# Patient Record
Sex: FEMALE | Race: WHITE | Employment: FULL TIME | URBAN - METROPOLITAN AREA
[De-identification: names, ages, dates, MRNs, and addresses within clinical notes are randomized per-mention and may not be internally consistent; named-entity substitution may affect disease eponyms.]

---

## 2020-06-10 ENCOUNTER — OFFICE VISIT (OUTPATIENT)
Dept: FAMILY MEDICINE CLINIC | Age: 60
End: 2020-06-10
Payer: MEDICARE

## 2020-06-10 ENCOUNTER — HOSPITAL ENCOUNTER (OUTPATIENT)
Age: 60
Discharge: HOME | End: 2020-06-12
Payer: MEDICARE

## 2020-06-10 VITALS
SYSTOLIC BLOOD PRESSURE: 102 MMHG | DIASTOLIC BLOOD PRESSURE: 60 MMHG | TEMPERATURE: 98.2 F | HEART RATE: 71 BPM | BODY MASS INDEX: 28.89 KG/M2 | RESPIRATION RATE: 16 BRPM | OXYGEN SATURATION: 100 % | HEIGHT: 62 IN | WEIGHT: 157 LBS

## 2020-06-10 LAB
ALBUMIN SERPL-MCNC: 4.2 G/DL (ref 3.5–5.2)
ALP BLD-CCNC: 72 U/L (ref 35–104)
ALT SERPL-CCNC: 17 U/L (ref 0–32)
ANION GAP SERPL CALCULATED.3IONS-SCNC: 17 MMOL/L (ref 7–16)
AST SERPL-CCNC: 24 U/L (ref 0–31)
BILIRUB SERPL-MCNC: 0.4 MG/DL (ref 0–1.2)
BUN BLDV-MCNC: 15 MG/DL (ref 8–23)
CALCIUM SERPL-MCNC: 9.3 MG/DL (ref 8.6–10.2)
CHLORIDE BLD-SCNC: 105 MMOL/L (ref 98–107)
CHOLESTEROL, TOTAL: 189 MG/DL (ref 0–199)
CO2: 20 MMOL/L (ref 22–29)
CREAT SERPL-MCNC: 0.8 MG/DL (ref 0.5–1)
GFR AFRICAN AMERICAN: >60
GFR NON-AFRICAN AMERICAN: >60 ML/MIN/1.73
GLUCOSE BLD-MCNC: 90 MG/DL (ref 74–99)
HBA1C MFR BLD: 5.9 % (ref 4–5.6)
HCT VFR BLD CALC: 40.5 % (ref 34–48)
HDLC SERPL-MCNC: 59 MG/DL
HEMOGLOBIN: 12.8 G/DL (ref 11.5–15.5)
LDL CHOLESTEROL CALCULATED: 116 MG/DL (ref 0–99)
MCH RBC QN AUTO: 29.6 PG (ref 26–35)
MCHC RBC AUTO-ENTMCNC: 31.6 % (ref 32–34.5)
MCV RBC AUTO: 93.5 FL (ref 80–99.9)
PDW BLD-RTO: 13.4 FL (ref 11.5–15)
PLATELET # BLD: 290 E9/L (ref 130–450)
PMV BLD AUTO: 10 FL (ref 7–12)
POTASSIUM SERPL-SCNC: 4.4 MMOL/L (ref 3.5–5)
RBC # BLD: 4.33 E12/L (ref 3.5–5.5)
SODIUM BLD-SCNC: 142 MMOL/L (ref 132–146)
TOTAL PROTEIN: 7.8 G/DL (ref 6.4–8.3)
TRIGL SERPL-MCNC: 71 MG/DL (ref 0–149)
TSH SERPL DL<=0.05 MIU/L-ACNC: 1.37 UIU/ML (ref 0.27–4.2)
VLDLC SERPL CALC-MCNC: 14 MG/DL
WBC # BLD: 4.4 E9/L (ref 4.5–11.5)

## 2020-06-10 PROCEDURE — 84443 ASSAY THYROID STIM HORMONE: CPT

## 2020-06-10 PROCEDURE — 36415 COLL VENOUS BLD VENIPUNCTURE: CPT | Performed by: FAMILY MEDICINE

## 2020-06-10 PROCEDURE — 80061 LIPID PANEL: CPT

## 2020-06-10 PROCEDURE — 83036 HEMOGLOBIN GLYCOSYLATED A1C: CPT

## 2020-06-10 PROCEDURE — 85027 COMPLETE CBC AUTOMATED: CPT

## 2020-06-10 PROCEDURE — 96372 THER/PROPH/DIAG INJ SC/IM: CPT | Performed by: FAMILY MEDICINE

## 2020-06-10 PROCEDURE — 99214 OFFICE O/P EST MOD 30 MIN: CPT | Performed by: FAMILY MEDICINE

## 2020-06-10 PROCEDURE — 80053 COMPREHEN METABOLIC PANEL: CPT

## 2020-06-10 RX ORDER — DEXAMETHASONE SODIUM PHOSPHATE 4 MG/ML
4 INJECTION, SOLUTION INTRA-ARTICULAR; INTRALESIONAL; INTRAMUSCULAR; INTRAVENOUS; SOFT TISSUE ONCE
Status: COMPLETED | OUTPATIENT
Start: 2020-06-10 | End: 2020-06-10

## 2020-06-10 RX ADMIN — DEXAMETHASONE SODIUM PHOSPHATE 4 MG: 4 INJECTION, SOLUTION INTRA-ARTICULAR; INTRALESIONAL; INTRAMUSCULAR; INTRAVENOUS; SOFT TISSUE at 08:32

## 2020-06-10 ASSESSMENT — PATIENT HEALTH QUESTIONNAIRE - PHQ9
SUM OF ALL RESPONSES TO PHQ QUESTIONS 1-9: 0
2. FEELING DOWN, DEPRESSED OR HOPELESS: 0
SUM OF ALL RESPONSES TO PHQ QUESTIONS 1-9: 0
1. LITTLE INTEREST OR PLEASURE IN DOING THINGS: 0
SUM OF ALL RESPONSES TO PHQ9 QUESTIONS 1 & 2: 0

## 2020-06-12 PROBLEM — L30.9 DERMATITIS: Status: ACTIVE | Noted: 2020-06-12

## 2020-06-12 PROBLEM — E78.2 MIXED HYPERLIPIDEMIA: Status: ACTIVE | Noted: 2020-06-12

## 2020-06-12 PROBLEM — R53.83 FATIGUE: Status: ACTIVE | Noted: 2020-06-12

## 2020-06-12 ASSESSMENT — ENCOUNTER SYMPTOMS
SHORTNESS OF BREATH: 0
RHINORRHEA: 0
ALLERGIC/IMMUNOLOGIC NEGATIVE: 1
VOMITING: 0
ANAL BLEEDING: 0
SORE THROAT: 0
TROUBLE SWALLOWING: 0
COLOR CHANGE: 0
EYE PAIN: 0
CHOKING: 0
BLOOD IN STOOL: 0
WHEEZING: 0
VOICE CHANGE: 0
RECTAL PAIN: 0
NAUSEA: 0
RESPIRATORY NEGATIVE: 1
EYE DISCHARGE: 0
COUGH: 0
EYE REDNESS: 0
BACK PAIN: 0
ABDOMINAL PAIN: 0
EYES NEGATIVE: 1
EYE ITCHING: 0
FACIAL SWELLING: 0
ABDOMINAL DISTENTION: 0
DIARRHEA: 0
STRIDOR: 0
SINUS PRESSURE: 0
PHOTOPHOBIA: 0
SINUS PAIN: 0
CHEST TIGHTNESS: 0
CONSTIPATION: 0

## 2020-06-12 NOTE — PROGRESS NOTES
KYE Joshi is a 61 y.o. female. HPI/Chief C/O:  Chief Complaint   Patient presents with   [facility]     located on neck, some on face     No Known Allergies  This 61year old female presents for physical exam. Pt c/o poison ivy on face, and neck. Pt denies shortness of breath, denies difficulty swallowing. ROS:  Review of Systems   Constitutional: Positive for fatigue. Negative for activity change, appetite change, chills, diaphoresis, fever and unexpected weight change. HENT: Negative. Negative for congestion, dental problem, drooling, ear discharge, ear pain, facial swelling, hearing loss, mouth sores, nosebleeds, postnasal drip, rhinorrhea, sinus pressure, sinus pain, sneezing, sore throat, tinnitus, trouble swallowing and voice change. Eyes: Negative. Negative for photophobia, pain, discharge, redness, itching and visual disturbance. Respiratory: Negative. Negative for cough, choking, chest tightness, shortness of breath, wheezing and stridor. Cardiovascular: Negative. Negative for chest pain, palpitations and leg swelling. Gastrointestinal: Negative for abdominal distention, abdominal pain, anal bleeding, blood in stool, constipation, diarrhea, nausea, rectal pain and vomiting. Endocrine: Negative. Negative for cold intolerance, heat intolerance, polydipsia, polyphagia and polyuria. Genitourinary: Negative. Negative for decreased urine volume, difficulty urinating, dysuria, flank pain, frequency, genital sores, hematuria, menstrual problem, pelvic pain and urgency. Musculoskeletal: Negative. Negative for arthralgias, back pain, gait problem, joint swelling, myalgias, neck pain and neck stiffness. Skin: Positive for rash. Negative for color change, pallor and wound. Allergic/Immunologic: Negative. Neurological: Negative.   Negative for dizziness, tremors, seizures, syncope, facial asymmetry, speech difficulty, weakness, light-headedness, numbness and

## 2020-08-31 ENCOUNTER — TELEPHONE (OUTPATIENT)
Dept: PRIMARY CARE CLINIC | Age: 60
End: 2020-08-31

## 2020-08-31 NOTE — TELEPHONE ENCOUNTER
Called pt to follow up with pt about over due order for [place]. No answer, LVM to return call to the office. Will try to call the pt tomorrow.   Geo Bradford

## 2020-10-12 ENCOUNTER — OFFICE VISIT (OUTPATIENT)
Dept: FAMILY MEDICINE CLINIC | Age: 60
End: 2020-10-12
Payer: COMMERCIAL

## 2020-10-12 VITALS
HEIGHT: 62 IN | TEMPERATURE: 98.6 F | OXYGEN SATURATION: 98 % | WEIGHT: 159 LBS | RESPIRATION RATE: 18 BRPM | BODY MASS INDEX: 29.26 KG/M2 | HEART RATE: 64 BPM

## 2020-10-12 PROCEDURE — 99213 OFFICE O/P EST LOW 20 MIN: CPT | Performed by: FAMILY MEDICINE

## 2020-10-12 PROCEDURE — 96372 THER/PROPH/DIAG INJ SC/IM: CPT | Performed by: FAMILY MEDICINE

## 2020-10-12 RX ORDER — DEXAMETHASONE SODIUM PHOSPHATE 4 MG/ML
4 INJECTION, SOLUTION INTRA-ARTICULAR; INTRALESIONAL; INTRAMUSCULAR; INTRAVENOUS; SOFT TISSUE ONCE
Status: COMPLETED | OUTPATIENT
Start: 2020-10-12 | End: 2020-10-12

## 2020-10-12 RX ORDER — METHYLPREDNISOLONE 4 MG/1
TABLET ORAL
Qty: 1 KIT | Refills: 0 | Status: SHIPPED | OUTPATIENT
Start: 2020-10-12 | End: 2020-10-18

## 2020-10-12 RX ORDER — FEXOFENADINE HCL 180 MG/1
180 TABLET ORAL DAILY
Qty: 30 TABLET | Refills: 0 | Status: SHIPPED | OUTPATIENT
Start: 2020-10-12 | End: 2020-11-11

## 2020-10-12 RX ADMIN — DEXAMETHASONE SODIUM PHOSPHATE 4 MG: 4 INJECTION, SOLUTION INTRA-ARTICULAR; INTRALESIONAL; INTRAMUSCULAR; INTRAVENOUS; SOFT TISSUE at 11:53

## 2020-10-12 ASSESSMENT — ENCOUNTER SYMPTOMS
EYE PAIN: 0
CONSTIPATION: 0
VOMITING: 0
SINUS PAIN: 0
SHORTNESS OF BREATH: 0
RESPIRATORY NEGATIVE: 1
ANAL BLEEDING: 0
RECTAL PAIN: 0
VOICE CHANGE: 0
ALLERGIC/IMMUNOLOGIC NEGATIVE: 1
ABDOMINAL PAIN: 0
WHEEZING: 0
BLOOD IN STOOL: 0
CHOKING: 0
ABDOMINAL DISTENTION: 0
SINUS PRESSURE: 0
CHEST TIGHTNESS: 0
DIARRHEA: 0
STRIDOR: 0
PHOTOPHOBIA: 0
EYE ITCHING: 0
SORE THROAT: 0
RHINORRHEA: 0
EYE DISCHARGE: 0
COLOR CHANGE: 0
EYES NEGATIVE: 1
EYE REDNESS: 0
NAUSEA: 0
FACIAL SWELLING: 0
TROUBLE SWALLOWING: 0
BACK PAIN: 0
COUGH: 0

## 2020-10-12 NOTE — PROGRESS NOTES
KYE Shah is a 61 y.o. female. HPI/Chief C/O:  Chief Complaint   Patient presents with    Poison Ivy     Pt c/o poison ivy rash on her face, neck, arms, and hands - thinks she got exposed on Thursday     No Known Allergies  The patient is here for a medication list and treatment planning review  We will go over our care planning goals as well as take care of all refills  We will set up labs as well   C/O poison ivy     Poison Ivy   Associated symptoms include fatigue. Pertinent negatives include no congestion, cough, diarrhea, eye pain, fever, rhinorrhea, shortness of breath, sore throat or vomiting. ROS:  Review of Systems   Constitutional: Positive for fatigue. Negative for activity change, appetite change, chills, diaphoresis, fever and unexpected weight change. HENT: Negative. Negative for congestion, dental problem, drooling, ear discharge, ear pain, facial swelling, hearing loss, mouth sores, nosebleeds, postnasal drip, rhinorrhea, sinus pressure, sinus pain, sneezing, sore throat, tinnitus, trouble swallowing and voice change. Eyes: Negative. Negative for photophobia, pain, discharge, redness, itching and visual disturbance. Respiratory: Negative. Negative for cough, choking, chest tightness, shortness of breath, wheezing and stridor. Cardiovascular: Negative. Negative for chest pain, palpitations and leg swelling. Gastrointestinal: Negative for abdominal distention, abdominal pain, anal bleeding, blood in stool, constipation, diarrhea, nausea, rectal pain and vomiting. Endocrine: Negative. Negative for cold intolerance, heat intolerance, polydipsia, polyphagia and polyuria. Genitourinary: Negative. Negative for decreased urine volume, difficulty urinating, dyspareunia, dysuria, enuresis, flank pain, frequency, genital sores, hematuria, menstrual problem, pelvic pain and urgency. Musculoskeletal: Negative.   Negative for arthralgias, back pain, gait problem, joint swelling, myalgias, neck pain and neck stiffness. Skin: Positive for rash. Negative for color change, pallor and wound. Allergic/Immunologic: Negative. Neurological: Negative. Negative for dizziness, tremors, seizures, syncope, facial asymmetry, speech difficulty, weakness, light-headedness, numbness and headaches. Hematological: Negative. Negative for adenopathy. Does not bruise/bleed easily. Psychiatric/Behavioral: Negative for agitation, behavioral problems, confusion, decreased concentration, dysphoric mood, hallucinations, self-injury, sleep disturbance and suicidal ideas. The patient is nervous/anxious. The patient is not hyperactive. Past Medical/Surgical Hx;  Reviewed with patient      Diagnosis Date    Mixed hyperlipidemia 6/12/2020     Past Surgical History:   Procedure Laterality Date    BREAST LUMPECTOMY      TONSILLECTOMY         Past Family Hx:  Reviewed with patient  History reviewed. No pertinent family history. Social Hx:  Reviewed with patient  Social History     Tobacco Use    Smoking status: Never Smoker    Smokeless tobacco: Never Used   Substance Use Topics    Alcohol use: Yes     Comment: occasionally       OBJECTIVE  Pulse 64   Temp 98.6 °F (37 °C) (Temporal)   Resp 18   Ht 5' 2\" (1.575 m)   Wt 159 lb (72.1 kg)   LMP 06/10/2016 (Exact Date)   SpO2 98%   BMI 29.08 kg/m²     Problem List:  Yenny Lyons does not have any pertinent problems on file. PHYS EX:  Physical Exam  Vitals signs and nursing note reviewed. Constitutional:       General: She is not in acute distress. Appearance: Normal appearance. She is well-developed. She is obese. She is not ill-appearing, toxic-appearing or diaphoretic. HENT:      Head: Normocephalic and atraumatic. Right Ear: Tympanic membrane, ear canal and external ear normal.      Left Ear: Tympanic membrane, ear canal and external ear normal.      Nose: Nose normal. No congestion or rhinorrhea. Mouth/Throat:      Mouth: Mucous membranes are moist.      Pharynx: Oropharynx is clear. No oropharyngeal exudate or posterior oropharyngeal erythema. Eyes:      General: No scleral icterus. Right eye: No discharge. Left eye: No discharge. Conjunctiva/sclera: Conjunctivae normal.      Pupils: Pupils are equal, round, and reactive to light. Neck:      Musculoskeletal: Normal range of motion and neck supple. No neck rigidity or muscular tenderness. Thyroid: No thyromegaly. Vascular: No carotid bruit or JVD. Trachea: No tracheal deviation. Cardiovascular:      Rate and Rhythm: Normal rate and regular rhythm. Pulses: Normal pulses. Heart sounds: Normal heart sounds. No murmur. No friction rub. No gallop. Pulmonary:      Effort: Pulmonary effort is normal. No respiratory distress. Breath sounds: Normal breath sounds. No stridor. No wheezing, rhonchi or rales. Chest:      Chest wall: No tenderness. Abdominal:      General: Bowel sounds are normal. There is no distension. Palpations: Abdomen is soft. There is no mass. Tenderness: There is no abdominal tenderness. There is no guarding or rebound. Hernia: No hernia is present. Musculoskeletal: Normal range of motion. General: No swelling, tenderness, deformity or signs of injury. Right lower leg: No edema. Left lower leg: No edema. Lymphadenopathy:      Cervical: No cervical adenopathy. Skin:     General: Skin is warm. Coloration: Skin is not jaundiced or pale. Findings: Rash present. No bruising, erythema or lesion. Comments: Pt has small areas of poison ivy on face and neck. Neurological:      General: No focal deficit present. Mental Status: She is alert and oriented to person, place, and time. Cranial Nerves: No cranial nerve deficit. Sensory: No sensory deficit. Motor: No weakness or abnormal muscle tone.       Coordination: Coordination normal.      Gait: Gait normal.      Deep Tendon Reflexes: Reflexes are normal and symmetric. Reflexes normal.         ASSESSMENT/PLAN  Randall Weston was seen today for poison ivy. Diagnoses and all orders for this visit:    Poison ivy  -     dexamethasone (DECADRON) injection 4 mg  -     methylPREDNISolone (MEDROL DOSEPACK) 4 MG tablet; Take as directed  -     hydrocortisone 2.5 % cream; Apply topically 2 times daily. -     fexofenadine (ALLEGRA) 180 MG tablet; Take 1 tablet by mouth daily    Screen for colon cancer  -     [place] (For External Results Only); Future    Fatigue, unspecified type  Long talk on treatment and prevention  Literature is given         Outpatient Encounter Medications as of 10/12/2020   Medication Sig Dispense Refill    methylPREDNISolone (MEDROL DOSEPACK) 4 MG tablet Take as directed 1 kit 0    hydrocortisone 2.5 % cream Apply topically 2 times daily. 1 Tube 3    fexofenadine (ALLEGRA) 180 MG tablet Take 1 tablet by mouth daily 30 tablet 0    [] dexamethasone (DECADRON) injection 4 mg        No facility-administered encounter medications on file as of 10/12/2020. No follow-ups on file.         Reviewed recent labs related to Aparna's current problems      Discussed importance of regular Health Maintenance follow up  Health Maintenance   Topic    Hepatitis C screen     DTaP/Tdap/Td vaccine (1 - Tdap)    Cervical cancer screen     Shingles Vaccine (1 of 2)    Colon cancer screen colonoscopy     Annual Wellness Visit (AWV)     Flu vaccine (1)    A1C test (Diabetic or Prediabetic)     Breast cancer screen     Lipid screen     HIV screen     Hepatitis A vaccine     Hepatitis B vaccine     Hib vaccine     Meningococcal (ACWY) vaccine     Pneumococcal 0-64 years Vaccine

## 2021-01-11 ENCOUNTER — HOSPITAL ENCOUNTER (OUTPATIENT)
Dept: GENERAL RADIOLOGY | Age: 61
Discharge: HOME | End: 2021-01-13
Payer: COMMERCIAL

## 2021-01-11 ENCOUNTER — HOSPITAL ENCOUNTER (OUTPATIENT)
Age: 61
Discharge: HOME | End: 2021-01-13
Payer: COMMERCIAL

## 2021-01-11 ENCOUNTER — VIRTUAL VISIT (OUTPATIENT)
Dept: FAMILY MEDICINE CLINIC | Age: 61
End: 2021-01-11
Payer: COMMERCIAL

## 2021-01-11 DIAGNOSIS — R50.9 FEVER, UNSPECIFIED FEVER CAUSE: ICD-10-CM

## 2021-01-11 DIAGNOSIS — R11.0 NAUSEA: ICD-10-CM

## 2021-01-11 DIAGNOSIS — R07.9 CHEST PAIN, UNSPECIFIED TYPE: Primary | ICD-10-CM

## 2021-01-11 DIAGNOSIS — R19.7 DIARRHEA, UNSPECIFIED TYPE: ICD-10-CM

## 2021-01-11 DIAGNOSIS — R07.9 CHEST PAIN, UNSPECIFIED TYPE: ICD-10-CM

## 2021-01-11 DIAGNOSIS — R43.2 LOSS OF TASTE: ICD-10-CM

## 2021-01-11 PROCEDURE — 99213 OFFICE O/P EST LOW 20 MIN: CPT | Performed by: FAMILY MEDICINE

## 2021-01-11 PROCEDURE — 71046 X-RAY EXAM CHEST 2 VIEWS: CPT

## 2021-01-11 RX ORDER — AZITHROMYCIN 250 MG/1
250 TABLET, FILM COATED ORAL SEE ADMIN INSTRUCTIONS
Qty: 6 TABLET | Refills: 0 | Status: SHIPPED | OUTPATIENT
Start: 2021-01-11 | End: 2021-01-16

## 2021-01-11 RX ORDER — METHYLPREDNISOLONE 4 MG/1
TABLET ORAL
Qty: 1 KIT | Refills: 0 | Status: SHIPPED | OUTPATIENT
Start: 2021-01-11 | End: 2021-01-17

## 2021-01-11 ASSESSMENT — PATIENT HEALTH QUESTIONNAIRE - PHQ9
1. LITTLE INTEREST OR PLEASURE IN DOING THINGS: 0
SUM OF ALL RESPONSES TO PHQ QUESTIONS 1-9: 0
SUM OF ALL RESPONSES TO PHQ QUESTIONS 1-9: 0

## 2021-01-12 ENCOUNTER — TELEPHONE (OUTPATIENT)
Dept: FAMILY MEDICINE CLINIC | Age: 61
End: 2021-01-12

## 2021-01-13 ENCOUNTER — TELEPHONE (OUTPATIENT)
Dept: FAMILY MEDICINE CLINIC | Age: 61
End: 2021-01-13

## 2021-01-13 NOTE — TELEPHONE ENCOUNTER
Patient notified. She will call and schedule for next week. Advised to let us know if symptoms change or worsen.   Electronically signed by Marlyn Grimes on 1/13/2021 at 9:19 AM

## 2021-01-13 NOTE — TELEPHONE ENCOUNTER
Call pt and let her know her CXR show inflammation lower lung     Continue medication and recheck virtual visit next week   Plan to recheck CXR in 1 week

## 2021-01-13 NOTE — TELEPHONE ENCOUNTER
Patient notified; will call next week to schedule.   Electronically signed by Estevan Raymond on 1/13/2021 at 10:16 AM

## 2021-01-13 NOTE — TELEPHONE ENCOUNTER
There is lower lobe inflammation  Continue medications and recheck next week  We will repeat chest X ray

## 2021-01-16 PROBLEM — R19.7 DIARRHEA: Status: ACTIVE | Noted: 2021-01-16

## 2021-01-16 PROBLEM — R43.2 LOSS OF TASTE: Status: ACTIVE | Noted: 2021-01-16

## 2021-01-16 PROBLEM — R07.9 CHEST PAIN: Status: ACTIVE | Noted: 2021-01-16

## 2021-01-16 PROBLEM — R11.0 NAUSEA: Status: ACTIVE | Noted: 2021-01-16

## 2021-01-16 PROBLEM — R50.9 FEVER: Status: ACTIVE | Noted: 2021-01-16

## 2021-01-16 ASSESSMENT — ENCOUNTER SYMPTOMS
CHEST TIGHTNESS: 1
COUGH: 0
ALLERGIC/IMMUNOLOGIC NEGATIVE: 1
SINUS PAIN: 0
TROUBLE SWALLOWING: 0
EYE REDNESS: 0
RHINORRHEA: 0
FACIAL SWELLING: 0
EYE ITCHING: 0
BACK PAIN: 0
SHORTNESS OF BREATH: 0
ABDOMINAL DISTENTION: 0
COLOR CHANGE: 0
CONSTIPATION: 0
CHOKING: 0
RECTAL PAIN: 0
PHOTOPHOBIA: 0
ANAL BLEEDING: 0
STRIDOR: 0
EYES NEGATIVE: 1
EYE DISCHARGE: 0
ABDOMINAL PAIN: 0
SORE THROAT: 0
EYE PAIN: 0
VOICE CHANGE: 0
DIARRHEA: 1
BLOOD IN STOOL: 0
WHEEZING: 0
SINUS PRESSURE: 0
NAUSEA: 1
VOMITING: 1

## 2021-01-17 NOTE — PROGRESS NOTES
SUBJECTIVE  Roberta Roper is a 64 y.o. female. HPI/Chief C/O:  Chief Complaint   Patient presents with    Fever     c/o fever; rundown-patient states she lost her mother one week ago and thinks it may be from this. She was tested for Covid at [facility] was negative. C/o taste and smell came back today. Fever  99; c/o pressure in her chest when she inhales. c/o diarrhea    Other     patient consents to telemedicine visit and is at home in [place]     No Known Allergies  This 64year old female presents with fever at night, nausea, vomiting, diarrhea, and chest pain with deep inspiration. Pt had negative covid on 1/3/2021. Pt lost sense of taste and smell and it is coming back. TeleMedicine Video Visit    This visit was performed as a virtual video visit using a synchronous, two-way, audio-video telehealth technology platform. Patient identification was verified at the start of the visit, including the patient's telephone number and physical location. I discussed with the patient the nature of our telehealth visits, that:     1. Due to the nature of an audio- video modality, the only components of a physical exam that could be done are the elements supported by direct observation. 2. I would evaluate the patient and recommend diagnostics and treatments based on my assessment. 3. If it was felt that the patient should be evaluated in clinic or an emergency room setting, then they would be directed there. 4. Our sessions are not being recorded and that personal health information is protected. 5. Our team would provide follow up care in person if/when the patient needs it. Patient does agree to proceed with telemedicine consultation. Patient's location: home address in [facility]  Physician  location other address in [facility] other people involved in call Dr. Lala Porras      Time spent: Greater than 20    This visit was completed virtually using Doxy. me      ROS:  Review of Systems Constitutional: Positive for fatigue and fever. Negative for activity change, appetite change, chills, diaphoresis and unexpected weight change. HENT: Negative. Negative for congestion, dental problem, drooling, ear discharge, ear pain, facial swelling, hearing loss, mouth sores, nosebleeds, postnasal drip, rhinorrhea, sinus pressure, sinus pain, sneezing, sore throat, tinnitus, trouble swallowing and voice change. Eyes: Negative. Negative for photophobia, pain, discharge, redness, itching and visual disturbance. Respiratory: Positive for chest tightness. Negative for cough, choking, shortness of breath, wheezing and stridor. Cardiovascular: Negative. Negative for chest pain, palpitations and leg swelling. Gastrointestinal: Positive for diarrhea, nausea and vomiting. Negative for abdominal distention, abdominal pain, anal bleeding, blood in stool, constipation and rectal pain. Endocrine: Negative. Negative for cold intolerance, heat intolerance, polydipsia, polyphagia and polyuria. Genitourinary: Negative. Negative for decreased urine volume, difficulty urinating, dysuria, flank pain, frequency, genital sores, hematuria, menstrual problem, pelvic pain and urgency. Musculoskeletal: Negative. Negative for arthralgias, back pain, gait problem, joint swelling, myalgias, neck pain and neck stiffness. Skin: Negative. Negative for color change, pallor, rash and wound. Allergic/Immunologic: Negative. Neurological: Negative. Negative for dizziness, tremors, seizures, syncope, facial asymmetry, speech difficulty, weakness, light-headedness, numbness and headaches. Hematological: Negative. Negative for adenopathy. Does not bruise/bleed easily. Psychiatric/Behavioral: Negative. Negative for agitation, behavioral problems, confusion, decreased concentration, dysphoric mood, hallucinations, self-injury, sleep disturbance and suicidal ideas.  The patient is not nervous/anxious and is not hyperactive. Past Medical/Surgical Hx;  Reviewed with patient      Diagnosis Date    Mixed hyperlipidemia 6/12/2020     Past Surgical History:   Procedure Laterality Date    BREAST LUMPECTOMY      TONSILLECTOMY         Past Family Hx:  Reviewed with patient  History reviewed. No pertinent family history. Social Hx:  Reviewed with patient  Social History     Tobacco Use    Smoking status: Never Smoker    Smokeless tobacco: Never Used   Substance Use Topics    Alcohol use: Yes     Comment: occasionally       OBJECTIVE  LMP 06/10/2016 (Exact Date)     Problem List:  Lyndsey Mac does not have any pertinent problems on file. PHYS EX:  Pt looks stable    ASSESSMENT/PLAN  Lyndsey Mac was seen today for fever and other. Diagnoses and all orders for this visit:    Chest pain, unspecified type  -     XR CHEST (2 VW); Future  -     methylPREDNISolone (MEDROL DOSEPACK) 4 MG tablet; Take as directed  Stable. Zpack, medrol, robitussin, decadron. Fever, unspecified fever cause  -     XR CHEST (2 VW); Future  -     azithromycin (ZITHROMAX) 250 MG tablet; Take 1 tablet by mouth See Admin Instructions for 5 days 500mg on day 1 followed by 250mg on days 2 - 5  -     methylPREDNISolone (MEDROL DOSEPACK) 4 MG tablet; Take as directed  Stable. Loss of taste  Stable. Repeat covid test.   Nausea  Stable. Diarrhea, unspecified type  Stable. Please take tylenol every 6 hours as needed for temperature or aches   Hydrate with 40 to 50 oz fluids   I have sent in medication for you  Please stay in touch and let me know how you are doing  If you get worse, please call asap  You should self isolate   Vit. C 1000 mg, vit D 1000 mg and zinc 220 mg daily    Pt instructed if any worse go ED ASAP.     Outpatient Encounter Medications as of 1/11/2021   Medication Sig Dispense Refill    azithromycin (ZITHROMAX) 250 MG tablet Take 1 tablet by mouth See Admin Instructions for 5 days 500mg on day 1 followed by 250mg on days 2 - 5 6 tablet 0    methylPREDNISolone (MEDROL DOSEPACK) 4 MG tablet Take as directed 1 kit 0    hydrocortisone 2.5 % cream Apply topically 2 times daily. (Patient not taking: Reported on 1/11/2021) 1 Tube 3     No facility-administered encounter medications on file as of 1/11/2021. No follow-ups on file.         Reviewed recent labs related to Aparna's current problems      Discussed importance of regular Health Maintenance follow up  Health Maintenance   Topic    Hepatitis C screen     DTaP/Tdap/Td vaccine (1 - Tdap)    Cervical cancer screen     Shingles Vaccine (1 of 2)    Flu vaccine (1)    A1C test (Diabetic or Prediabetic)     Breast cancer screen     Colon cancer screen fecal DNA test (Cologuard)     Lipid screen     HIV screen     Hepatitis A vaccine     Hepatitis B vaccine     Hib vaccine     Meningococcal (ACWY) vaccine     Pneumococcal 0-64 years Vaccine

## 2021-01-22 ENCOUNTER — HOSPITAL ENCOUNTER (OUTPATIENT)
Age: 61
Discharge: HOME | End: 2021-01-24
Payer: COMMERCIAL

## 2021-01-22 ENCOUNTER — HOSPITAL ENCOUNTER (OUTPATIENT)
Dept: GENERAL RADIOLOGY | Age: 61
Discharge: HOME | End: 2021-01-24
Payer: COMMERCIAL

## 2021-01-22 ENCOUNTER — VIRTUAL VISIT (OUTPATIENT)
Dept: FAMILY MEDICINE CLINIC | Age: 61
End: 2021-01-22
Payer: COMMERCIAL

## 2021-01-22 DIAGNOSIS — J18.9 PNEUMONIA OF BOTH LOWER LOBES DUE TO INFECTIOUS ORGANISM: ICD-10-CM

## 2021-01-22 DIAGNOSIS — R53.83 FATIGUE, UNSPECIFIED TYPE: ICD-10-CM

## 2021-01-22 DIAGNOSIS — J90 PLEURAL EFFUSION: ICD-10-CM

## 2021-01-22 DIAGNOSIS — J90 PLEURAL EFFUSION: Primary | ICD-10-CM

## 2021-01-22 PROCEDURE — 99213 OFFICE O/P EST LOW 20 MIN: CPT | Performed by: FAMILY MEDICINE

## 2021-01-22 PROCEDURE — 71046 X-RAY EXAM CHEST 2 VIEWS: CPT

## 2021-01-22 RX ORDER — ALBUTEROL SULFATE 90 UG/1
2 AEROSOL, METERED RESPIRATORY (INHALATION) EVERY 6 HOURS PRN
Qty: 1 INHALER | Refills: 3 | Status: SHIPPED
Start: 2021-01-22 | End: 2021-09-08

## 2021-01-22 RX ORDER — ASPIRIN 81 MG/1
81 TABLET ORAL DAILY
COMMUNITY
End: 2022-02-16

## 2021-01-27 PROBLEM — J90 PLEURAL EFFUSION: Status: ACTIVE | Noted: 2021-01-27

## 2021-01-27 PROBLEM — J18.9 PNEUMONIA OF BOTH LOWER LOBES DUE TO INFECTIOUS ORGANISM: Status: ACTIVE | Noted: 2021-01-27

## 2021-01-27 ASSESSMENT — ENCOUNTER SYMPTOMS
RECTAL PAIN: 0
SINUS PRESSURE: 0
ANAL BLEEDING: 0
ABDOMINAL DISTENTION: 0
CONSTIPATION: 0
FACIAL SWELLING: 0
EYE DISCHARGE: 0
NAUSEA: 0
DIARRHEA: 0
BLOOD IN STOOL: 0
COUGH: 0
RHINORRHEA: 0
TROUBLE SWALLOWING: 0
ALLERGIC/IMMUNOLOGIC NEGATIVE: 1
ABDOMINAL PAIN: 0
EYE ITCHING: 0
SHORTNESS OF BREATH: 1
CHEST TIGHTNESS: 0
EYE PAIN: 0
SORE THROAT: 0
VOICE CHANGE: 0
VOMITING: 0
CHOKING: 0
BACK PAIN: 0
STRIDOR: 0
SINUS PAIN: 0
WHEEZING: 0
EYES NEGATIVE: 1
PHOTOPHOBIA: 0
EYE REDNESS: 0
COLOR CHANGE: 0

## 2021-01-28 NOTE — PROGRESS NOTES
KYE Pride is a 64 y.o. female. HPI/Chief C/O:  Chief Complaint   Patient presents with    Pneumonia     Recheck ; patient states she feels much better, she sometimes gets SOB but not as much.  Medication Refill     Combivent inhaler was not covered by insurance; patient is requesting an alternative inhaler     No Known Allergies  This 64year old female presents with pleural effusion, and pneumonia richi. Lower lobes with a negative covid test. Pt states she feels much better. Pt has shortness of breath only when talking a lot. Pt denies chest pain, denies fever, and denies cough. TeleMedicine Video Visit    This visit was performed as a virtual video visit using a synchronous, two-way, audio-video telehealth technology platform. Patient identification was verified at the start of the visit, including the patient's telephone number and physical location. I discussed with the patient the nature of our telehealth visits, that:     1. Due to the nature of an audio- video modality, the only components of a physical exam that could be done are the elements supported by direct observation. 2. I would evaluate the patient and recommend diagnostics and treatments based on my assessment. 3. If it was felt that the patient should be evaluated in clinic or an emergency room setting, then they would be directed there. 4. Our sessions are not being recorded and that personal health information is protected. 5. Our team would provide follow up care in person if/when the patient needs it. Patient does agree to proceed with telemedicine consultation. Patient's location: home address in [facility]  Physician  location other address in [facility] other people involved in call Dr. Alexia Lezama      Time spent: Greater than 20    This visit was completed virtually using Doxy. me      ROS:  Review of Systems   Constitutional: Positive for fatigue.  Negative for activity change, appetite change, chills, 6/12/2020     Past Surgical History:   Procedure Laterality Date    BREAST LUMPECTOMY      TONSILLECTOMY         Past Family Hx:  Reviewed with patient  History reviewed. No pertinent family history. Social Hx:  Reviewed with patient  Social History     Tobacco Use    Smoking status: Never Smoker    Smokeless tobacco: Never Used   Substance Use Topics    Alcohol use: Yes     Comment: occasionally       OBJECTIVE  LMP 06/10/2016 (Exact Date)     Problem List:  Sanna Aguirre does not have any pertinent problems on file. PHYS EX:  Pt is talking without any difficulty    ASSESSMENT/PLAN  Sanna Aguirre was seen today for pneumonia and medication refill. Diagnoses and all orders for this visit:    Pleural effusion  -     XR CHEST (2 VW); Future  -     albuterol sulfate  (90 Base) MCG/ACT inhaler; Inhale 2 puffs into the lungs every 6 hours as needed for Wheezing or Shortness of Breath  Stable. Pneumonia of both lower lobes due to infectious organism  -     XR CHEST (2 VW); Future  -     albuterol sulfate  (90 Base) MCG/ACT inhaler; Inhale 2 puffs into the lungs every 6 hours as needed for Wheezing or Shortness of Breath  Stable. Fatigue, unspecified type  Stable. Rest.     Pt instructed to take aspirin 325 mg, vit. C 1000 mg, vit D 1000 mg, and zinc 220 mg. Pt instructed if any worse go ED ASAP. Outpatient Encounter Medications as of 1/22/2021   Medication Sig Dispense Refill    aspirin 81 MG EC tablet Take 81 mg by mouth daily      albuterol sulfate  (90 Base) MCG/ACT inhaler Inhale 2 puffs into the lungs every 6 hours as needed for Wheezing or Shortness of Breath 1 Inhaler 3    [DISCONTINUED] albuterol-ipratropium (COMBIVENT RESPIMAT)  MCG/ACT AERS inhaler Inhale 1 puff into the lungs every 6 hours 1 Inhaler 0    [DISCONTINUED] hydrocortisone 2.5 % cream Apply topically 2 times daily.  (Patient not taking: Reported on 1/11/2021) 1 Tube 3     No facility-administered encounter medications on file as of 1/22/2021. No follow-ups on file.         Reviewed recent labs related to Aparna's current problems      Discussed importance of regular Health Maintenance follow up  Health Maintenance   Topic    Hepatitis C screen     Pneumococcal 0-64 years Vaccine (1 of 1 - PPSV23)    DTaP/Tdap/Td vaccine (1 - Tdap)    Cervical cancer screen     Shingles Vaccine (1 of 2)    Flu vaccine (1)    A1C test (Diabetic or Prediabetic)     Breast cancer screen     Colon cancer screen fecal DNA test (Cologuard)     Lipid screen     HIV screen     Hepatitis A vaccine     Hepatitis B vaccine     Hib vaccine     Meningococcal (ACWY) vaccine

## 2021-02-08 DIAGNOSIS — J18.9 PNEUMONIA OF BOTH LUNGS DUE TO INFECTIOUS ORGANISM, UNSPECIFIED PART OF LUNG: Primary | ICD-10-CM

## 2021-08-31 ENCOUNTER — TELEPHONE (OUTPATIENT)
Dept: FAMILY MEDICINE CLINIC | Age: 61
End: 2021-08-31

## 2021-08-31 DIAGNOSIS — Z12.31 SCREENING MAMMOGRAM FOR HIGH-RISK PATIENT: Primary | ICD-10-CM

## 2021-09-08 ENCOUNTER — OFFICE VISIT (OUTPATIENT)
Dept: FAMILY MEDICINE CLINIC | Age: 61
End: 2021-09-08
Payer: COMMERCIAL

## 2021-09-08 VITALS
DIASTOLIC BLOOD PRESSURE: 80 MMHG | RESPIRATION RATE: 18 BRPM | BODY MASS INDEX: 30 KG/M2 | HEART RATE: 74 BPM | TEMPERATURE: 98.6 F | SYSTOLIC BLOOD PRESSURE: 120 MMHG | OXYGEN SATURATION: 98 % | HEIGHT: 62 IN | WEIGHT: 163 LBS

## 2021-09-08 DIAGNOSIS — U07.1 PNEUMONIA DUE TO COVID-19 VIRUS: ICD-10-CM

## 2021-09-08 DIAGNOSIS — Z00.00 PHYSICAL EXAM: ICD-10-CM

## 2021-09-08 DIAGNOSIS — J12.82 PNEUMONIA DUE TO COVID-19 VIRUS: ICD-10-CM

## 2021-09-08 DIAGNOSIS — K12.1 VIRAL STOMATITIS: Primary | ICD-10-CM

## 2021-09-08 DIAGNOSIS — R53.83 FATIGUE, UNSPECIFIED TYPE: ICD-10-CM

## 2021-09-08 DIAGNOSIS — Z12.31 ENCOUNTER FOR SCREENING MAMMOGRAM FOR MALIGNANT NEOPLASM OF BREAST: ICD-10-CM

## 2021-09-08 DIAGNOSIS — E78.2 MIXED HYPERLIPIDEMIA: ICD-10-CM

## 2021-09-08 DIAGNOSIS — B97.89 VIRAL STOMATITIS: Primary | ICD-10-CM

## 2021-09-08 DIAGNOSIS — R73.01 IFG (IMPAIRED FASTING GLUCOSE): ICD-10-CM

## 2021-09-08 PROCEDURE — 99214 OFFICE O/P EST MOD 30 MIN: CPT | Performed by: FAMILY MEDICINE

## 2021-09-08 RX ORDER — ACYCLOVIR 50 MG/G
OINTMENT TOPICAL
Qty: 1 EACH | Refills: 0 | Status: SHIPPED | OUTPATIENT
Start: 2021-09-08 | End: 2021-09-15

## 2021-09-08 SDOH — ECONOMIC STABILITY: FOOD INSECURITY: WITHIN THE PAST 12 MONTHS, YOU WORRIED THAT YOUR FOOD WOULD RUN OUT BEFORE YOU GOT MONEY TO BUY MORE.: NEVER TRUE

## 2021-09-08 SDOH — ECONOMIC STABILITY: FOOD INSECURITY: WITHIN THE PAST 12 MONTHS, THE FOOD YOU BOUGHT JUST DIDN'T LAST AND YOU DIDN'T HAVE MONEY TO GET MORE.: NEVER TRUE

## 2021-09-08 ASSESSMENT — SOCIAL DETERMINANTS OF HEALTH (SDOH): HOW HARD IS IT FOR YOU TO PAY FOR THE VERY BASICS LIKE FOOD, HOUSING, MEDICAL CARE, AND HEATING?: NOT HARD AT ALL

## 2021-09-10 PROBLEM — Z00.00 PHYSICAL EXAM: Status: ACTIVE | Noted: 2021-09-10

## 2021-09-10 PROBLEM — B97.89 VIRAL STOMATITIS: Status: ACTIVE | Noted: 2021-09-10

## 2021-09-10 PROBLEM — Z12.31 ENCOUNTER FOR SCREENING MAMMOGRAM FOR MALIGNANT NEOPLASM OF BREAST: Status: ACTIVE | Noted: 2021-09-10

## 2021-09-10 PROBLEM — U07.1 PNEUMONIA DUE TO COVID-19 VIRUS: Status: ACTIVE | Noted: 2021-09-10

## 2021-09-10 PROBLEM — K12.1 VIRAL STOMATITIS: Status: ACTIVE | Noted: 2021-09-10

## 2021-09-10 PROBLEM — J12.82 PNEUMONIA DUE TO COVID-19 VIRUS: Status: ACTIVE | Noted: 2021-09-10

## 2021-09-10 ASSESSMENT — ENCOUNTER SYMPTOMS
VOMITING: 0
EYE REDNESS: 0
ABDOMINAL DISTENTION: 0
NAUSEA: 0
EYE ITCHING: 0
SINUS PAIN: 0
TROUBLE SWALLOWING: 0
CHOKING: 0
COLOR CHANGE: 0
SORE THROAT: 0
FACIAL SWELLING: 0
EYES NEGATIVE: 1
SINUS PRESSURE: 0
ALLERGIC/IMMUNOLOGIC NEGATIVE: 1
DIARRHEA: 0
EYE PAIN: 0
BLOOD IN STOOL: 0
WHEEZING: 0
STRIDOR: 0
COUGH: 0
RESPIRATORY NEGATIVE: 1
CONSTIPATION: 0
BACK PAIN: 0
ANAL BLEEDING: 0
SHORTNESS OF BREATH: 0
VOICE CHANGE: 0
PHOTOPHOBIA: 0
CHEST TIGHTNESS: 0
ABDOMINAL PAIN: 0
RECTAL PAIN: 0
EYE DISCHARGE: 0
RHINORRHEA: 0

## 2021-09-10 NOTE — PROGRESS NOTES
SUBJECTIVE  Kennedi Lutz is a 64 y.o. female. HPI/Chief C/O:  Chief Complaint   Patient presents with    Annual Exam     Physical    Oral Pain     Pt c/o chapped lips x3 weeks, pt states that she has tried treating at home but they are not healing    Halima Paz     No Known Allergies    This 64year old female presents with chapped, burned lips for 3 weeks. Pt states she first got sunburned and she got chapped, not improving. Pt  Has hyperlipidemia, and fatigue. Pt has covid pneumonia, will set up recheck chest xray. ROS:  Review of Systems   Constitutional: Positive for fatigue. Negative for activity change, appetite change, chills, diaphoresis, fever and unexpected weight change. HENT: Negative. Negative for congestion, dental problem, drooling, ear discharge, ear pain, facial swelling, hearing loss, mouth sores, nosebleeds, postnasal drip, rhinorrhea, sinus pressure, sinus pain, sneezing, sore throat, tinnitus, trouble swallowing and voice change. Eyes: Negative. Negative for photophobia, pain, discharge, redness, itching and visual disturbance. Respiratory: Negative. Negative for cough, choking, chest tightness, shortness of breath, wheezing and stridor. Cardiovascular: Negative. Negative for chest pain, palpitations and leg swelling. Gastrointestinal: Negative for abdominal distention, abdominal pain, anal bleeding, blood in stool, constipation, diarrhea, nausea, rectal pain and vomiting. Endocrine: Negative. Negative for cold intolerance, heat intolerance, polydipsia, polyphagia and polyuria. Genitourinary: Negative. Negative for decreased urine volume, difficulty urinating, dyspareunia, dysuria, enuresis, flank pain, frequency, genital sores, hematuria, menstrual problem, pelvic pain and urgency. Musculoskeletal: Negative.   Negative for arthralgias, back pain, gait problem, joint swelling, myalgias, neck pain and neck stiffness. Skin: Positive for rash. Negative for color change, pallor and wound. Allergic/Immunologic: Negative. Neurological: Negative. Negative for dizziness, tremors, seizures, syncope, facial asymmetry, speech difficulty, weakness, light-headedness, numbness and headaches. Hematological: Negative. Negative for adenopathy. Does not bruise/bleed easily. Psychiatric/Behavioral: Negative for agitation, behavioral problems, confusion, decreased concentration, dysphoric mood, hallucinations, self-injury, sleep disturbance and suicidal ideas. The patient is nervous/anxious. The patient is not hyperactive. Past Medical/Surgical Hx;  Reviewed with patient      Diagnosis Date    Mixed hyperlipidemia 6/12/2020     Past Surgical History:   Procedure Laterality Date    BREAST LUMPECTOMY      TONSILLECTOMY         Past Family Hx:  Reviewed with patient  History reviewed. No pertinent family history. Social Hx:  Reviewed with patient  Social History     Tobacco Use    Smoking status: Never Smoker    Smokeless tobacco: Never Used   Substance Use Topics    Alcohol use: Yes     Comment: occasionally       OBJECTIVE  /80   Pulse 74   Temp 98.6 °F (37 °C) (Temporal)   Resp 18   Ht 5' 2\" (1.575 m)   Wt 163 lb (73.9 kg)   LMP 06/10/2016 (Exact Date)   SpO2 98%   Breastfeeding No   BMI 29.81 kg/m²     Problem List:  Susie Collier does not have any pertinent problems on file. PHYS EX:  Physical Exam  Vitals and nursing note reviewed. Constitutional:       General: She is not in acute distress. Appearance: Normal appearance. She is well-developed. She is obese. She is not ill-appearing, toxic-appearing or diaphoretic. Comments: Patient has morbid obesity. Patient instructed on low calorie, healthy diet. HENT:      Head: Normocephalic and atraumatic.       Right Ear: Tympanic membrane, ear canal and external ear normal.      Left Ear: Tympanic membrane, ear canal and external ear normal.      Nose: Nose normal. No congestion or rhinorrhea. Mouth/Throat:      Mouth: Mucous membranes are moist.      Pharynx: Oropharynx is clear. No oropharyngeal exudate or posterior oropharyngeal erythema. Eyes:      General: No scleral icterus. Right eye: No discharge. Left eye: No discharge. Conjunctiva/sclera: Conjunctivae normal.      Pupils: Pupils are equal, round, and reactive to light. Neck:      Thyroid: No thyromegaly. Vascular: No carotid bruit or JVD. Trachea: No tracheal deviation. Cardiovascular:      Rate and Rhythm: Normal rate and regular rhythm. Pulses: Normal pulses. Heart sounds: Normal heart sounds. No murmur heard. No friction rub. No gallop. Pulmonary:      Effort: Pulmonary effort is normal. No respiratory distress. Breath sounds: Normal breath sounds. No stridor. No wheezing, rhonchi or rales. Chest:      Chest wall: No tenderness. Abdominal:      General: Bowel sounds are normal. There is no distension. Palpations: Abdomen is soft. There is no mass. Tenderness: There is no abdominal tenderness. There is no guarding or rebound. Hernia: No hernia is present. Musculoskeletal:         General: No swelling, tenderness, deformity or signs of injury. Normal range of motion. Cervical back: Normal range of motion and neck supple. No rigidity. No muscular tenderness. Right lower leg: No edema. Left lower leg: No edema. Lymphadenopathy:      Cervical: No cervical adenopathy. Skin:     General: Skin is warm. Coloration: Skin is not jaundiced or pale. Findings: Rash present. No bruising, erythema or lesion. Comments: Pt has viral infection lips. Lips erythema, and slightly swollen. Neurological:      General: No focal deficit present. Mental Status: She is alert and oriented to person, place, and time. Cranial Nerves: No cranial nerve deficit.       Sensory: No sensory deficit. Motor: No weakness or abnormal muscle tone. Coordination: Coordination normal.      Gait: Gait normal.      Deep Tendon Reflexes: Reflexes are normal and symmetric. Reflexes normal.   Psychiatric:         Mood and Affect: Mood normal.         Behavior: Behavior normal.         Thought Content: Thought content normal.         Judgment: Judgment normal.         ASSESSMENT/PLAN  Crystal Baron was seen today for annual exam, oral pain and health maintenance. Diagnoses and all orders for this visit:    Viral stomatitis  -     CBC Auto Differential; Future  -     Comprehensive Metabolic Panel; Future  -     acyclovir (ZOVIRAX) 5 % ointment; Apply topically 5 times daily. Encounter for screening mammogram for malignant neoplasm of breast  -     KARLA DIGITAL SCREEN W OR WO CAD BILATERAL; Future  -     CBC Auto Differential; Future  -     Comprehensive Metabolic Panel; Future    Physical exam  -     CBC Auto Differential; Future  -     Comprehensive Metabolic Panel; Future    Mixed hyperlipidemia  -     CBC Auto Differential; Future  -     Comprehensive Metabolic Panel; Future  -     Lipid Panel; Future    IFG (impaired fasting glucose)  -     CBC Auto Differential; Future  -     Comprehensive Metabolic Panel; Future  -     Hemoglobin A1C; Future    Fatigue, unspecified type  -     CBC Auto Differential; Future  -     Comprehensive Metabolic Panel; Future  -     TSH without Reflex; Future    Pneumonia due to COVID-19 virus  -     XR CHEST (2 VW); Future    Pt instructed if any worse go ED ASAP. Outpatient Encounter Medications as of 9/8/2021   Medication Sig Dispense Refill    acyclovir (ZOVIRAX) 5 % ointment Apply topically 5 times daily.  1 each 0    aspirin 81 MG EC tablet Take 81 mg by mouth daily (Patient not taking: Reported on 9/8/2021)      [DISCONTINUED] albuterol sulfate  (90 Base) MCG/ACT inhaler Inhale 2 puffs into the lungs every 6 hours as needed for Wheezing or Shortness of Breath (Patient not taking: Reported on 9/8/2021) 1 Inhaler 3     No facility-administered encounter medications on file as of 9/8/2021. No follow-ups on file.         Reviewed recent labs related to Aparna's current problems      Discussed importance of regular Health Maintenance follow up  Health Maintenance   Topic    Hepatitis C screen     DTaP/Tdap/Td vaccine (1 - Tdap)    Cervical cancer screen     Shingles Vaccine (1 of 2)    Flu vaccine (1)    Breast cancer screen     Colon cancer screen fecal DNA test (Cologuard)     Lipid screen     COVID-19 Vaccine     HIV screen     Hepatitis A vaccine     Hepatitis B vaccine     Hib vaccine     Meningococcal (ACWY) vaccine     Pneumococcal 0-64 years Vaccine

## 2021-09-30 DIAGNOSIS — Z12.31 ENCOUNTER FOR SCREENING MAMMOGRAM FOR MALIGNANT NEOPLASM OF BREAST: ICD-10-CM

## 2021-10-10 PROBLEM — Z00.00 PHYSICAL EXAM: Status: RESOLVED | Noted: 2021-09-10 | Resolved: 2021-10-10

## 2021-10-10 PROBLEM — Z12.31 ENCOUNTER FOR SCREENING MAMMOGRAM FOR MALIGNANT NEOPLASM OF BREAST: Status: RESOLVED | Noted: 2021-09-10 | Resolved: 2021-10-10

## 2022-02-03 ENCOUNTER — NURSE TRIAGE (OUTPATIENT)
Dept: OTHER | Facility: CLINIC | Age: 62
End: 2022-02-03

## 2022-02-03 NOTE — TELEPHONE ENCOUNTER
Received call from [facility] at [facility] with Red Flag Complaint. Subjective: Caller states \"chest pain below breast bone, happens after eating\"     Current Symptoms: chest pain when eating, will reflux up food at times, breads are an issue     Onset: 2 months ago; sudden    Associated Symptoms: NA    Pain Severity: irritating, 3/10/10; burning; intermittent    Temperature: n/a n/a     What has been tried: Pepto helped settle it down     LMP: NA Pregnant: NA    Recommended disposition: to be seen today or tomorrow    Care advice provided, patient verbalizes understanding; denies any other questions or concerns; instructed to call back for any new or worsening symptoms. Writer provided warm transfer to [facility] at [facility] for appointment scheduling     Attention Provider: Thank you for allowing me to participate in the care of your patient. The patient was connected to triage in response to information provided to the ECC/PSC. Please do not respond through this encounter as the response is not directed to a shared pool.           Reason for Disposition   MILD TO MODERATE pain that comes and goes (cramps) lasts > 24 hours    Protocols used: ABDOMINAL PAIN - UPPER-ADULT-OH

## 2022-02-08 ENCOUNTER — OFFICE VISIT (OUTPATIENT)
Dept: FAMILY MEDICINE CLINIC | Age: 62
End: 2022-02-08
Payer: COMMERCIAL

## 2022-02-08 VITALS
DIASTOLIC BLOOD PRESSURE: 64 MMHG | TEMPERATURE: 98.2 F | RESPIRATION RATE: 16 BRPM | HEIGHT: 62 IN | BODY MASS INDEX: 30.18 KG/M2 | WEIGHT: 164 LBS | SYSTOLIC BLOOD PRESSURE: 120 MMHG | OXYGEN SATURATION: 98 % | HEART RATE: 73 BPM

## 2022-02-08 DIAGNOSIS — R10.84 GENERALIZED ABDOMINAL PAIN: Primary | ICD-10-CM

## 2022-02-08 DIAGNOSIS — E78.5 DYSLIPIDEMIA: ICD-10-CM

## 2022-02-08 DIAGNOSIS — R73.01 IFG (IMPAIRED FASTING GLUCOSE): ICD-10-CM

## 2022-02-08 DIAGNOSIS — R53.83 FATIGUE, UNSPECIFIED TYPE: ICD-10-CM

## 2022-02-08 DIAGNOSIS — K21.00 GASTROESOPHAGEAL REFLUX DISEASE WITH ESOPHAGITIS WITHOUT HEMORRHAGE: ICD-10-CM

## 2022-02-08 PROCEDURE — 99213 OFFICE O/P EST LOW 20 MIN: CPT | Performed by: FAMILY MEDICINE

## 2022-02-08 PROCEDURE — 1036F TOBACCO NON-USER: CPT | Performed by: FAMILY MEDICINE

## 2022-02-08 PROCEDURE — G8484 FLU IMMUNIZE NO ADMIN: HCPCS | Performed by: FAMILY MEDICINE

## 2022-02-08 PROCEDURE — G8427 DOCREV CUR MEDS BY ELIG CLIN: HCPCS | Performed by: FAMILY MEDICINE

## 2022-02-08 PROCEDURE — G8417 CALC BMI ABV UP PARAM F/U: HCPCS | Performed by: FAMILY MEDICINE

## 2022-02-08 PROCEDURE — 3017F COLORECTAL CA SCREEN DOC REV: CPT | Performed by: FAMILY MEDICINE

## 2022-02-08 RX ORDER — OMEPRAZOLE 20 MG/1
20 CAPSULE, DELAYED RELEASE ORAL DAILY
Qty: 90 CAPSULE | Refills: 1 | Status: SHIPPED
Start: 2022-02-08 | End: 2022-10-19 | Stop reason: ALTCHOICE

## 2022-02-08 ASSESSMENT — ENCOUNTER SYMPTOMS
EYE DISCHARGE: 0
RHINORRHEA: 0
EYE REDNESS: 0
BLOOD IN STOOL: 0
RESPIRATORY NEGATIVE: 1
CHEST TIGHTNESS: 0
SHORTNESS OF BREATH: 0
EYE PAIN: 0
PHOTOPHOBIA: 0
WHEEZING: 0
SINUS PRESSURE: 0
APNEA: 0
CHOKING: 0
EYES NEGATIVE: 1
VOICE CHANGE: 0
STRIDOR: 0
SINUS PAIN: 0
COLOR CHANGE: 0
COUGH: 0
ORTHOPNEA: 0
BLURRED VISION: 0
EYE ITCHING: 0
SORE THROAT: 0
ALLERGIC/IMMUNOLOGIC NEGATIVE: 1
BACK PAIN: 0
ANAL BLEEDING: 0
FACIAL SWELLING: 0
TROUBLE SWALLOWING: 0
ABDOMINAL DISTENTION: 0
RECTAL PAIN: 0

## 2022-02-08 ASSESSMENT — PATIENT HEALTH QUESTIONNAIRE - PHQ9
SUM OF ALL RESPONSES TO PHQ9 QUESTIONS 1 & 2: 0
SUM OF ALL RESPONSES TO PHQ QUESTIONS 1-9: 0
1. LITTLE INTEREST OR PLEASURE IN DOING THINGS: 0
SUM OF ALL RESPONSES TO PHQ QUESTIONS 1-9: 0
2. FEELING DOWN, DEPRESSED OR HOPELESS: 0

## 2022-02-08 NOTE — PROGRESS NOTES
SUBJECTIVE  Itz Heart is a 58 y.o. female. HPI/Chief C/O:  Chief Complaint   Patient presents with    Abdominal Pain     Pt c/o of adominal pain after eating     No Known Allergies  The patient is here for a medication list and treatment planning review  We will go over our care planning goals as well as take care of all refills  We will set up labs as well   C/O pain and  Lump when she swallows     Hypertension  Associated symptoms include anxiety and malaise/fatigue. Pertinent negatives include no blurred vision, chest pain, headaches, neck pain, orthopnea, palpitations, peripheral edema, PND, shortness of breath or sweats. Risk factors for coronary artery disease include obesity. Past treatments include lifestyle changes. The current treatment provides significant improvement. Compliance problems include diet and exercise. There is no history of angina, kidney disease, CAD/MI, CVA, heart failure, left ventricular hypertrophy, PVD or retinopathy. There is no history of chronic renal disease, coarctation of the aorta, hyperaldosteronism, hypercortisolism, hyperparathyroidism, a hypertension causing med, pheochromocytoma, renovascular disease, sleep apnea or a thyroid problem. ROS:  Review of Systems   Constitutional: Positive for fatigue and malaise/fatigue. Negative for activity change, appetite change, chills, diaphoresis and unexpected weight change. HENT: Negative. Negative for congestion, dental problem, drooling, ear discharge, ear pain, facial swelling, hearing loss, mouth sores, nosebleeds, postnasal drip, rhinorrhea, sinus pressure, sinus pain, sneezing, sore throat, tinnitus, trouble swallowing and voice change. Eyes: Negative. Negative for blurred vision, photophobia, pain, discharge, redness, itching and visual disturbance. Respiratory: Negative. Negative for apnea, cough, choking, chest tightness, shortness of breath, wheezing and stridor. Cardiovascular: Negative. Negative for chest pain, palpitations, orthopnea, leg swelling and PND. Gastrointestinal: Negative for abdominal distention, anal bleeding, blood in stool and rectal pain. Endocrine: Negative. Negative for cold intolerance, heat intolerance, polydipsia, polyphagia and polyuria. Genitourinary: Negative. Negative for decreased urine volume, difficulty urinating, dyspareunia, enuresis, flank pain, genital sores, menstrual problem, pelvic pain and urgency. Musculoskeletal: Negative. Negative for back pain, gait problem, joint swelling, neck pain and neck stiffness. Skin: Negative for color change, pallor, rash and wound. Allergic/Immunologic: Negative. Neurological: Negative. Negative for dizziness, tremors, seizures, syncope, facial asymmetry, speech difficulty, weakness, light-headedness, numbness and headaches. Hematological: Negative. Negative for adenopathy. Does not bruise/bleed easily. Psychiatric/Behavioral: Negative for agitation, behavioral problems, confusion, decreased concentration, dysphoric mood, hallucinations, self-injury, sleep disturbance and suicidal ideas. The patient is nervous/anxious. The patient is not hyperactive. Past Medical/Surgical Hx;  Reviewed with patient      Diagnosis Date    Mixed hyperlipidemia 6/12/2020     Past Surgical History:   Procedure Laterality Date    BREAST LUMPECTOMY      TONSILLECTOMY         Past Family Hx:  Reviewed with patient  History reviewed. No pertinent family history.     Social Hx:  Reviewed with patient  Social History     Tobacco Use    Smoking status: Never Smoker    Smokeless tobacco: Never Used   Substance Use Topics    Alcohol use: Yes     Comment: occasionally       OBJECTIVE  /64   Pulse 73   Temp 98.2 °F (36.8 °C)   Resp 16   Ht 5' 2\" (1.575 m)   Wt 164 lb (74.4 kg)   LMP 06/10/2016 (Exact Date)   SpO2 98%   Breastfeeding No   BMI 30.00 kg/m²     Problem List:  Horacio Reyes does not have any pertinent problems on file. PHYS EX:  Physical Exam  Vitals and nursing note reviewed. Constitutional:       General: She is not in acute distress. Appearance: Normal appearance. She is well-developed. She is obese. She is not ill-appearing, toxic-appearing or diaphoretic. Comments: Patient has morbid obesity. Patient instructed on low calorie, healthy diet. HENT:      Head: Normocephalic and atraumatic. Right Ear: External ear normal.      Left Ear: External ear normal.      Nose: Nose normal. No congestion or rhinorrhea. Mouth/Throat:      Mouth: Mucous membranes are moist.      Pharynx: Oropharynx is clear. No oropharyngeal exudate or posterior oropharyngeal erythema. Eyes:      General: No scleral icterus. Right eye: No discharge. Left eye: No discharge. Conjunctiva/sclera: Conjunctivae normal.      Pupils: Pupils are equal, round, and reactive to light. Neck:      Thyroid: No thyromegaly. Vascular: No carotid bruit or JVD. Trachea: No tracheal deviation. Cardiovascular:      Rate and Rhythm: Normal rate and regular rhythm. Pulses: Normal pulses. Heart sounds: Normal heart sounds. No murmur heard. No friction rub. No gallop. Pulmonary:      Effort: Pulmonary effort is normal. No respiratory distress. Breath sounds: Normal breath sounds. No stridor. No wheezing, rhonchi or rales. Chest:      Chest wall: No tenderness. Abdominal:      General: Bowel sounds are normal. There is no distension. Palpations: Abdomen is soft. There is no mass. Tenderness: There is no abdominal tenderness. There is no guarding or rebound. Hernia: No hernia is present. Musculoskeletal:         General: No swelling, tenderness, deformity or signs of injury. Normal range of motion. Cervical back: Normal range of motion and neck supple. No rigidity. No muscular tenderness. Right lower leg: No edema. Left lower leg: No edema. Lymphadenopathy:      Cervical: No cervical adenopathy. Skin:     General: Skin is warm. Coloration: Skin is not jaundiced or pale. Findings: No bruising, erythema, lesion or rash. Neurological:      General: No focal deficit present. Mental Status: She is alert and oriented to person, place, and time. Cranial Nerves: No cranial nerve deficit. Sensory: No sensory deficit. Motor: No weakness or abnormal muscle tone. Coordination: Coordination normal.      Gait: Gait normal.      Deep Tendon Reflexes: Reflexes are normal and symmetric. Reflexes normal.   Psychiatric:         Mood and Affect: Mood normal.         Behavior: Behavior normal.         Thought Content: Thought content normal.         Judgment: Judgment normal.         ASSESSMENT/PLAN  Kain Rogel was seen today for abdominal pain. Diagnoses and all orders for this visit:    Generalized abdominal pain  -     omeprazole (PRILOSEC) 20 MG delayed release capsule; Take 1 capsule by mouth Daily  -     US ABDOMEN LIMITED; Future  -     LIPASE; Future  -     Basic Metabolic Panel; Future  -     CBC Auto Differential; Future  Long talk on treatment and prevention  Literature is given       IFG (impaired fasting glucose)  -     Basic Metabolic Panel; Future  -     CBC Auto Differential; Future  -     Hemoglobin A1C; Future    Dyslipidemia  -     Basic Metabolic Panel; Future  -     Lipid Panel; Future  -     CBC Auto Differential; Future  --Mediterranean diet, exercise, weight loss, vitamins    We have a long talk on cholesterol and importance of lowering it       Fatigue, unspecified type  -     TSH without Reflex; Future  -     Basic Metabolic Panel;  Future  -     CBC Auto Differential; Future  Long talk on treatment and prevention  Literature is given           Outpatient Encounter Medications as of 2/8/2022   Medication Sig Dispense Refill    omeprazole (PRILOSEC) 20 MG delayed release capsule Take 1 capsule by mouth Daily 90 capsule 1    aspirin 81 MG EC tablet Take 81 mg by mouth daily (Patient not taking: Reported on 2/8/2022)       No facility-administered encounter medications on file as of 2/8/2022. No follow-ups on file.         Reviewed recent labs related to Aparna's current problems      Discussed importance of regular Health Maintenance follow up  Health Maintenance   Topic    Hepatitis C screen     Depression Screen     DTaP/Tdap/Td vaccine (1 - Tdap)    Cervical cancer screen     Shingles Vaccine (1 of 2)    COVID-19 Vaccine (2 - Booster for [employer] series)    Flu vaccine (1)    Breast cancer screen     Colon cancer screen fecal DNA test (Cologuard)     Lipid screen     HIV screen     Hepatitis A vaccine     Hepatitis B vaccine     Hib vaccine     Meningococcal (ACWY) vaccine     Pneumococcal 0-64 years Vaccine

## 2022-02-08 NOTE — PATIENT INSTRUCTIONS
Patient Education        Gastroesophageal Reflux Disease (GERD): Care Instructions  Overview     Gastroesophageal reflux disease (GERD) is the backward flow of stomach acid into the esophagus. The esophagus is the tube that leads from your throat to your stomach. A one-way valve prevents the stomach acid from backing up into this tube. But when you have GERD, this valve does not close tightly enough. This can also cause pain and swelling in your esophagus. (This is called esophagitis.)  If you have mild GERD symptoms including heartburn, you may be able to control the problem with antacids or over-the-counter medicine. You can also make lifestyle changes to help reduce your symptoms. These include changing your diet and eating habits, such as not eating late at night and losing weight. Follow-up care is a key part of your treatment and safety. Be sure to make and go to all appointments, and call your doctor if you are having problems. It's also a good idea to know your test results and keep a list of the medicines you take. How can you care for yourself at home? · Take your medicines exactly as prescribed. Call your doctor if you think you are having a problem with your medicine. · Your doctor may recommend over-the-counter medicine. For mild or occasional indigestion, antacids, such as Tums, Gaviscon, Mylanta, or Maalox, may help. Your doctor also may recommend over-the-counter acid reducers, such as famotidine (Pepcid AC), cimetidine (Tagamet HB), or omeprazole (Prilosec). Read and follow all instructions on the label. If you use these medicines often, talk with your doctor. · Change your eating habits. ? It's best to eat several small meals instead of two or three large meals. ? After you eat, wait 2 to 3 hours before you lie down. ? Avoid foods that make your symptoms worse.  These may include chocolate, mint, alcohol, pepper, spicy foods, high-fat foods, or drinks with caffeine in them, such as tea, coffee, taran, or energy drinks. If your symptoms are worse after you eat a certain food, you may want to stop eating it to see if your symptoms get better. · Do not smoke or chew tobacco. Smoking can make GERD worse. If you need help quitting, talk to your doctor about stop-smoking programs and medicines. These can increase your chances of quitting for good. · If you have GERD symptoms at night, raise the head of your bed 6 to 8 inches by putting the frame on blocks or placing a foam wedge under the head of your mattress. (Adding extra pillows does not work.)  · Do not wear tight clothing around your middle. · Lose weight if you need to. Losing just 5 to 10 pounds can help. When should you call for help? Call your doctor now or seek immediate medical care if:    · You have new or different belly pain.     · Your stools are black and tarlike or have streaks of blood. Watch closely for changes in your health, and be sure to contact your doctor if:    · Your symptoms have not improved after 2 days.     · Food seems to catch in your throat or chest.   Where can you learn more? Go to https://Blue Perch.[employer]. org and sign in to your [employer] account. Enter L722 in the [facility] box to learn more about \"Gastroesophageal Reflux Disease (GERD): Care Instructions. \"     If you do not have an account, please click on the \"Sign Up Now\" link. Current as of: September 8, 2021               Content Version: 13.1  © 2006-2021 [employer]. Care instructions adapted under license by [facility] ([facility]). If you have questions about a medical condition or this instruction, always ask your healthcare professional. [place] [address] any warranty or liability for your use of this information.          Patient Education        Esophagitis: Care Instructions  Your Care Instructions     Esophagitis (say \"ih-sof-uh-JY-tus\") is irritation of the esophagus, the tube that carries food from your throat to your stomach. Acid reflux is the most common cause of this condition. When you have reflux, stomach acid and juices flow upward. This can cause pain or a burning feeling in your chest. You may have a sore throat. It may be hard to swallow. Other causes of this condition include some medicines and supplements. Allergies or an infection can also cause it. Your doctor will ask about your symptoms and past health. He or she might do tests to find the cause of your symptoms. Treatment depends on what is causing the problem. Treatment might include changing your diet or taking medicine to relieve your symptoms. It might also include changing a medicine that is causing your symptoms. If you have reflux, medicine that reduces the stomach acid helps your body heal. It might take 1 to 3 weeks to heal.  Follow-up care is a key part of your treatment and safety. Be sure to make and go to all appointments, and call your doctor if you are having problems. It's also a good idea to know your test results and keep a list of the medicines you take. How can you care for yourself at home? · If you have acid reflux, your doctor may recommend that you:  ? Eat several small meals instead of two or three large meals. After you eat, wait 2 to 3 hours before you lie down. ? Avoid chocolate, mint, alcohol, and spicy foods. ? Don't smoke or use smokeless tobacco. Smoking can make this condition worse. If you need help quitting, talk to your doctor about stop-smoking programs and medicines. These can increase your chances of quitting for good. ? Raise the head of your bed 6 to 8 inches if you have symptoms at night. ? Lose weight if you are overweight. ? Take an over-the-counter antacid, such as Maalox, Mylanta, or Tums. Be careful when you take over-the-counter antacid medicines. Many of these medicines have aspirin in them. Read the label to make sure that you are not taking more than the recommended dose.  Too much aspirin can be harmful. ? Take stronger acid reducers. Examples are famotidine (such as Pepcid) and omeprazole (such as Prilosec). · If your condition is caused by infection, allergy, or other problems, use the medicine or treatments that your doctor recommends. · Be safe with medicines. Take your medicines exactly as prescribed. Call your doctor if you think you are having a problem with your medicine. When should you call for help? Call your doctor now or seek immediate medical care if:    · You have new or worse belly pain.     · You are vomiting. Watch closely for changes in your health, and be sure to contact your doctor if:    · You have new or worse symptoms of reflux.     · You have trouble or pain swallowing.     · You are losing weight.     · You do not get better as expected. Where can you learn more? Go to https://[employer]Nanostellareb.AquaBlok. org and sign in to your [employer] account. Enter L978 in the [employer] box to learn more about \"Esophagitis: Care Instructions. \"     If you do not have an account, please click on the \"Sign Up Now\" link. Current as of: September 8, 2021               Content Version: 13.1  © 8417-4117 Healthwise, Incorporated. Care instructions adapted under license by [facility] ([facility]). If you have questions about a medical condition or this instruction, always ask your healthcare professional. [address]  any warranty or liability for your use of this information.

## 2022-02-15 LAB
AVERAGE GLUCOSE: NORMAL
BASOPHILS ABSOLUTE: NORMAL
BASOPHILS RELATIVE PERCENT: NORMAL
BUN BLDV-MCNC: 13 MG/DL
CALCIUM SERPL-MCNC: 9.6 MG/DL
CHLORIDE BLD-SCNC: 104 MMOL/L
CHOLESTEROL, TOTAL: 209 MG/DL
CHOLESTEROL/HDL RATIO: 3.5
CO2: 29 MMOL/L
CREAT SERPL-MCNC: 0.81 MG/DL
EOSINOPHILS ABSOLUTE: NORMAL
EOSINOPHILS RELATIVE PERCENT: NORMAL
GFR CALCULATED: 78
GLUCOSE BLD-MCNC: 89 MG/DL
HBA1C MFR BLD: 5.6 %
HCT VFR BLD CALC: 41.8 % (ref 36–46)
HDLC SERPL-MCNC: 60 MG/DL (ref 35–70)
HEMOGLOBIN: 14.1 G/DL (ref 12–16)
LDL CHOLESTEROL CALCULATED: 129 MG/DL (ref 0–160)
LIPASE: 35 UNITS/L
LYMPHOCYTES ABSOLUTE: NORMAL
LYMPHOCYTES RELATIVE PERCENT: NORMAL
MCH RBC QN AUTO: 29.4 PG
MCHC RBC AUTO-ENTMCNC: 33.7 G/DL
MCV RBC AUTO: 87.1 FL
MONOCYTES ABSOLUTE: NORMAL
MONOCYTES RELATIVE PERCENT: NORMAL
NEUTROPHILS ABSOLUTE: NORMAL
NEUTROPHILS RELATIVE PERCENT: NORMAL
NONHDLC SERPL-MCNC: 149 MG/DL
PLATELET # BLD: 321 K/ΜL
PMV BLD AUTO: 10.2 FL
POTASSIUM SERPL-SCNC: 5.2 MMOL/L
RBC # BLD: 4.8 10^6/ΜL
SODIUM BLD-SCNC: 141 MMOL/L
TRIGL SERPL-MCNC: 95 MG/DL
TSH SERPL DL<=0.05 MIU/L-ACNC: 1.54 UIU/ML
VLDLC SERPL CALC-MCNC: NORMAL MG/DL
WBC # BLD: 5.1 10^3/ML

## 2022-02-16 ENCOUNTER — OFFICE VISIT (OUTPATIENT)
Dept: SURGERY | Age: 62
End: 2022-02-16
Payer: COMMERCIAL

## 2022-02-16 VITALS
SYSTOLIC BLOOD PRESSURE: 125 MMHG | TEMPERATURE: 98.4 F | DIASTOLIC BLOOD PRESSURE: 78 MMHG | WEIGHT: 165 LBS | RESPIRATION RATE: 16 BRPM | HEIGHT: 62 IN | HEART RATE: 73 BPM | BODY MASS INDEX: 30.36 KG/M2

## 2022-02-16 DIAGNOSIS — E66.9 OBESITY (BMI 30-39.9): ICD-10-CM

## 2022-02-16 DIAGNOSIS — E78.5 DYSLIPIDEMIA: ICD-10-CM

## 2022-02-16 DIAGNOSIS — K59.04 CHRONIC IDIOPATHIC CONSTIPATION: ICD-10-CM

## 2022-02-16 DIAGNOSIS — R73.01 IFG (IMPAIRED FASTING GLUCOSE): ICD-10-CM

## 2022-02-16 DIAGNOSIS — R10.84 GENERALIZED ABDOMINAL PAIN: ICD-10-CM

## 2022-02-16 DIAGNOSIS — R53.83 FATIGUE, UNSPECIFIED TYPE: ICD-10-CM

## 2022-02-16 DIAGNOSIS — R10.13 EPIGASTRIC PAIN: Primary | ICD-10-CM

## 2022-02-16 PROCEDURE — G8484 FLU IMMUNIZE NO ADMIN: HCPCS | Performed by: SURGERY

## 2022-02-16 PROCEDURE — G8417 CALC BMI ABV UP PARAM F/U: HCPCS | Performed by: SURGERY

## 2022-02-16 PROCEDURE — 3017F COLORECTAL CA SCREEN DOC REV: CPT | Performed by: SURGERY

## 2022-02-16 PROCEDURE — G8427 DOCREV CUR MEDS BY ELIG CLIN: HCPCS | Performed by: SURGERY

## 2022-02-16 PROCEDURE — 99204 OFFICE O/P NEW MOD 45 MIN: CPT | Performed by: SURGERY

## 2022-02-16 PROCEDURE — 1036F TOBACCO NON-USER: CPT | Performed by: SURGERY

## 2022-02-16 RX ORDER — POLYETHYLENE GLYCOL 3350 17 G/17G
237 POWDER, FOR SOLUTION ORAL ONCE
Qty: 237 G | Refills: 0 | Status: SHIPPED | OUTPATIENT
Start: 2022-02-16 | End: 2022-02-16

## 2022-02-16 RX ORDER — BISACODYL 5 MG
TABLET, DELAYED RELEASE (ENTERIC COATED) ORAL
Qty: 8 TABLET | Refills: 0 | Status: ON HOLD
Start: 2022-02-16 | End: 2022-03-14 | Stop reason: HOSPADM

## 2022-02-16 RX ORDER — SODIUM, POTASSIUM,MAG SULFATES 17.5-3.13G
1 SOLUTION, RECONSTITUTED, ORAL ORAL 2 TIMES DAILY
Qty: 2 EACH | Refills: 0 | Status: SHIPPED | OUTPATIENT
Start: 2022-02-16 | End: 2022-02-17

## 2022-02-16 RX ORDER — PSYLLIUM HUSK 3.4 G/5.8G
1 POWDER ORAL 2 TIMES DAILY
Qty: 60 PACKET | Refills: 11 | Status: SHIPPED | OUTPATIENT
Start: 2022-02-16 | End: 2022-03-18

## 2022-02-16 NOTE — PROGRESS NOTES
[facility]    General Surgery Attending History and Physical    Patient's Name/Date of Birth: Monae Warner / 1960 (89 y.o.)    Date: February 16, 2022     CC:abdominal pain    HPI:  57 yo complains of epigastric pain. Today she had a sandwich and the food feels that it is stuck in the epigastric region. She notes that spicy foods burn. She feels normal around 9-10 pm after eating lunch. Pt started on omperazole 20 mg daily 1 week ago and she notes the burning is better. She moves her bowels 1-2 x per week. She has to strain. She drinks lots of water. She states  The stools are hard balls. The patient reported  acute, intermittent pain localized to the epigastrium that started 3 months. The intensity of the pain is moderate. There are no alleviating or worsening factors regarding the pain. Pt notes her bowels have been abnromal for months. SHe had a normal cologuard 2 years ago. Patient was referred to me from her daughter Jong Collier and Muriel Orozco from [employer], who she works with    Past Medical History:   Diagnosis Date    Mixed hyperlipidemia 6/12/2020       Past Surgical History:   Procedure Laterality Date    BREAST LUMPECTOMY      TONSILLECTOMY         Current Outpatient Medications   Medication Sig Dispense Refill    omeprazole (PRILOSEC) 20 MG delayed release capsule Take 1 capsule by mouth Daily 90 capsule 1     No current facility-administered medications for this visit. No Known Allergies    No family history on file.     Social History     Socioeconomic History    Marital status:      Spouse name: Not on file    Number of children: Not on file    Years of education: Not on file    Highest education level: Not on file   Occupational History    Not on file   Tobacco Use    Smoking status: Never Smoker    Smokeless tobacco: Never Used   Substance and Sexual Activity    Alcohol use: Yes     Comment: occasionally    Drug use: No    Sexual activity: Not on file   Other Topics Concern    Not on file   Social History Narrative    Not on file     Social Determinants of Health     Financial Resource Strain: Low Risk     Difficulty of Paying Living Expenses: Not hard at all   Food Insecurity: No Food Insecurity    Worried About Running Out of Food in the Last Year: Never true    [address] in the Last Year: Never true   Transportation Needs:     Lack of Transportation (Medical): Not on file    Lack of Transportation (Non-Medical):  Not on file   Physical Activity:     Days of Exercise per Week: Not on file    Minutes of Exercise per Session: Not on file   Stress:     Feeling of Stress : Not on file   Social Connections:     Frequency of Communication with Friends and Family: Not on file    Frequency of Social Gatherings with Friends and Family: Not on file    Attends Holiness Services: Not on file    Active Member of [address] [place] [address] [employer] or Organizations: Not on file    Attends Club or Organization Meetings: Not on file    Marital Status: Not on file   Intimate Partner Violence:     Fear of Current or Ex-Partner: Not on file    Emotionally Abused: Not on file    Physically Abused: Not on file    Sexually Abused: Not on file   Housing Stability:     Unable to Pay for Housing in the Last Year: Not on file    Number of [place] in the Last Year: Not on file    Unstable Housing in the Last Year: Not on file       ROS:  Review of Systems - History obtained from the patient  General ROS: negative  Psychological ROS: negative  Ophthalmic ROS: negative  Allergy and Immunology ROS: negative  Hematological and Lymphatic ROS: negative  Endocrine ROS: negative  Breast ROS: negative  Respiratory ROS: negative  Cardiovascular ROS: negative  Gastrointestinal ROS: positive for - abdominal pain   Genito-Urinary ROS: negative  Musculoskeletal ROS: negative        Physical Exam:  Vitals:    02/16/22 1429   BP: 125/78   Pulse: 73   Resp: 16   Temp: 98.4 °F (36.9 °C) PSYCH: mood and affect normal, alert and oriented x 3  CONSTITUTIONAL: No apparent distress, comfortable  EYES: Sclera white, pupils equal round and reactive to light  ENMT:  Hearing normal, trachea midline, ears externally intact  LYMPH: no lympadenopathy in neck. No lympadenopathy in groins  RESP: Breath sounds were clear and equal with no rales, wheezes, or rhonchi. Respiratory effort was normal with no retractions or use of accessory muscles. CV: Heart sounds were normal with a regular rate and rhythm. No pedal edema  GI/ Abdomen: The abdomen was soft and non distended. There was no tenderness, guarding, rebound, or rigidity. There was no                     masses, hepatosplenomegaly, or hernias. No inguinal hernias were noted on coughing and straining. Rectal -deferred  MSK: no clubbing/ no cyanosis/ gait normal       Assessment/Plan:  Visit Diagnoses       Codes    Epigastric pain    -  Primary R10.13    Chronic idiopathic constipation     K59.04    Obesity (BMI 30-39. 9)     E66.9        1. Epigastric pain  Worsened by spicy foods  Pt notes foods is stuck in the pigastrium     I have reviewed the prior progress note from the patient's primary care provider visit on 2/8   I have reviewed the following test results: CBC/ BMP/ lipase, TSH. I will follow up on the US of the gallbladder to be done on 2/28    COntinue the prilosec 20 mg daily since some improvement after 1 week of the medication    Plan on EGD to Rule out Peptic ulcer disease  I have discussed the risks, benefits, and alternatives to esophagogastroduodenoscopy with possible biopsy with deep sedation with the patient. I have detailed the risks of deep sedation (hypotension, hypoxia) as well as complications of bleeding and perforation. The patient understands the above and agrees to proceed.     2. Constipation--start metamucil 1 tbsp bid  I discussed the risks, benefits, and alternatives to colonoscopy

## 2022-02-16 NOTE — PROGRESS NOTES
MA submitted surgery scheduling request in [place] . MA Scheduled pt for EGD/Colonoscopy on 03/14/2022 at 11:30am. Pt needs to arrive at [address] [place] [address] at 10:30am. Patient confirmed date and time. Address and directions given.      Electronically signed by Rock Arce on 2/16/22 at 3:17 PM EST

## 2022-02-28 ENCOUNTER — HOSPITAL ENCOUNTER (OUTPATIENT)
Dept: ULTRASOUND IMAGING | Age: 62
Discharge: HOME | End: 2022-03-02
Payer: COMMERCIAL

## 2022-02-28 DIAGNOSIS — R10.84 GENERALIZED ABDOMINAL PAIN: ICD-10-CM

## 2022-02-28 PROCEDURE — 76705 ECHO EXAM OF ABDOMEN: CPT

## 2022-03-09 ENCOUNTER — TELEPHONE (OUTPATIENT)
Dept: FAMILY MEDICINE CLINIC | Age: 62
End: 2022-03-09

## 2022-03-09 NOTE — PROGRESS NOTES
[address] PRE-ADMISSION TESTING   ENDOSCOPY/ COLONSCOPY INSTRUCTIONS  PAT- Phone Number: [phone]    ENDOSCOPY/ COLONSCOPY INSTRUCTIONS:     [x] Bowel prep instructions reviewed. [x] Colonoscopy- The day prior: No solid foods. Clear liquids only. [x] Nothing by mouth after midnight. Including no gum, candy, mints, or water. [x] You may brush your teeth, gargle, but do NOT swallow water. [x] Do not wear makeup, lotions, powders, deodorant. [x] Arrange transportation with a responsible adult  to and from the hospital. If you do not have a responsible adult  to transport you, you will need to make arrangements with a medical transportation company. Arrange for someone to be with you for the remainder of the day and for 24 hours after your procedure due to having had anesthesia. -Who will be your  for transportation? _friend_________________   -Who will be staying with you for 24 hrs after your procedure?__friend________________    PARKING INSTRUCTIONS:     [x] ARRIVAL TIME:  1030   · [x] Parking lot  \"I\" OR 1 is located on [facility] (the corner of [address] [place] [address]). To enter, press the button and the gate will lift. A free token will be provided to exit the lot. · [x] One car per patient is allowed to park in this lot. All other cars are to park on [address] [place] [address] either in the parking garage or the handicap lot. · [x] To reach the [facility] lobby from [address] [place] [address], upon entering the hospital, take elevator B to the 3rd floor. EDUCATION INSTRUCTIONS:    [x] Bring a complete list of your medications, please write the last time you took the medicine, give this list to the nurse. [x] Take the following medications the morning of surgery with 1-2 ounces of water:   [x] Stop herbal supplements and vitamins 5 days before your surgery. [] DO NOT take any diabetic medicine the morning of surgery.   Follow instructions for insulin the day before surgery. [] If you are diabetic and your blood sugar is low or you feel symptomatic, you may drink 1-2 ounces of apple juice or take a glucose tablet. The morning of your procedure, you may call the pre-op area if you have concerns about your blood sugar [phone]. [] Use your inhalers the morning of surgery. Bring your emergency inhaler with you day of surgery. [] Follow physician instructions regarding any blood thinners you may be taking. WHAT TO EXPECT:    [x] The day of your procedure you will be greeted and checked in by the [employer].  In addition, you will be registered in the [place] by a Patient Access Representative. Please bring your photo ID and insurance card. A nurse will greet you in accordance to the time you are needed in the pre-op area to prepare you for surgery. Please do not be discouraged if you are not greeted in the order you arrive as there are many variables that are involved in patient preparation. Your patience is greatly appreciated as you wait for your nurse. Please bring in items such as: books, magazines, newspapers, electronics, or any other items  to occupy your time in the waiting area. [x]  Delays may occur. Staff will make a sincere effort to keep you informed of delays. If any delays occur with your procedure, we apologize ahead of time for your inconvenience as we recognize the value of your time.

## 2022-03-11 ENCOUNTER — CLINICAL DOCUMENTATION (OUTPATIENT)
Dept: FAMILY MEDICINE CLINIC | Age: 62
End: 2022-03-11

## 2022-03-11 NOTE — TELEPHONE ENCOUNTER
Her last lipid level does show a dyslipidemia that is diet and risk modification according to american heart association guidelines

## 2022-03-11 NOTE — PROGRESS NOTES
The 10-year ASCVD risk score (Farzad Zeng et al., 2013) is: 3.8%    Values used to calculate the score:      Age: 58 years      Sex: Female      Is Non- : No      Diabetic: No      Tobacco smoker: No      Systolic Blood Pressure: 906 mmHg      Is BP treated: No      HDL Cholesterol: 60 mg/dL      Total Cholesterol: 209 mg/dL

## 2022-03-14 ENCOUNTER — ANESTHESIA (OUTPATIENT)
Dept: ENDOSCOPY | Age: 62
End: 2022-03-14
Payer: COMMERCIAL

## 2022-03-14 ENCOUNTER — ANESTHESIA EVENT (OUTPATIENT)
Dept: ENDOSCOPY | Age: 62
End: 2022-03-14
Payer: COMMERCIAL

## 2022-03-14 ENCOUNTER — HOSPITAL ENCOUNTER (OUTPATIENT)
Age: 62
Setting detail: OUTPATIENT SURGERY
Discharge: HOME | End: 2022-03-14
Attending: SURGERY | Admitting: SURGERY
Payer: COMMERCIAL

## 2022-03-14 VITALS
OXYGEN SATURATION: 97 % | DIASTOLIC BLOOD PRESSURE: 65 MMHG | SYSTOLIC BLOOD PRESSURE: 125 MMHG | RESPIRATION RATE: 29 BRPM

## 2022-03-14 VITALS
TEMPERATURE: 97.1 F | WEIGHT: 165 LBS | RESPIRATION RATE: 16 BRPM | HEART RATE: 77 BPM | BODY MASS INDEX: 30.36 KG/M2 | OXYGEN SATURATION: 97 % | HEIGHT: 62 IN | SYSTOLIC BLOOD PRESSURE: 123 MMHG | DIASTOLIC BLOOD PRESSURE: 74 MMHG

## 2022-03-14 PROCEDURE — 2580000003 HC RX 258: Performed by: SURGERY

## 2022-03-14 PROCEDURE — 7100000010 HC PHASE II RECOVERY - FIRST 15 MIN: Performed by: SURGERY

## 2022-03-14 PROCEDURE — 88342 IMHCHEM/IMCYTCHM 1ST ANTB: CPT

## 2022-03-14 PROCEDURE — 2500000003 HC RX 250 WO HCPCS

## 2022-03-14 PROCEDURE — 45380 COLONOSCOPY AND BIOPSY: CPT | Performed by: SURGERY

## 2022-03-14 PROCEDURE — 6360000002 HC RX W HCPCS

## 2022-03-14 PROCEDURE — 43239 EGD BIOPSY SINGLE/MULTIPLE: CPT | Performed by: SURGERY

## 2022-03-14 PROCEDURE — 3700000001 HC ADD 15 MINUTES (ANESTHESIA): Performed by: SURGERY

## 2022-03-14 PROCEDURE — 7100000011 HC PHASE II RECOVERY - ADDTL 15 MIN: Performed by: SURGERY

## 2022-03-14 PROCEDURE — 88305 TISSUE EXAM BY PATHOLOGIST: CPT

## 2022-03-14 PROCEDURE — 2709999900 HC NON-CHARGEABLE SUPPLY: Performed by: SURGERY

## 2022-03-14 PROCEDURE — 3700000000 HC ANESTHESIA ATTENDED CARE: Performed by: SURGERY

## 2022-03-14 PROCEDURE — 3609012400 HC EGD TRANSORAL BIOPSY SINGLE/MULTIPLE: Performed by: SURGERY

## 2022-03-14 PROCEDURE — 3609010600 HC COLONOSCOPY POLYPECTOMY SNARE/COLD BIOPSY: Performed by: SURGERY

## 2022-03-14 RX ORDER — SODIUM CHLORIDE 0.9 % (FLUSH) 0.9 %
5-40 SYRINGE (ML) INJECTION PRN
Status: DISCONTINUED | OUTPATIENT
Start: 2022-03-14 | End: 2022-03-14 | Stop reason: HOSPADM

## 2022-03-14 RX ORDER — SODIUM CHLORIDE 9 MG/ML
25 INJECTION, SOLUTION INTRAVENOUS PRN
Status: DISCONTINUED | OUTPATIENT
Start: 2022-03-14 | End: 2022-03-14 | Stop reason: HOSPADM

## 2022-03-14 RX ORDER — SODIUM CHLORIDE 0.9 % (FLUSH) 0.9 %
5-40 SYRINGE (ML) INJECTION EVERY 12 HOURS SCHEDULED
Status: DISCONTINUED | OUTPATIENT
Start: 2022-03-14 | End: 2022-03-14 | Stop reason: HOSPADM

## 2022-03-14 RX ORDER — LIDOCAINE HYDROCHLORIDE 20 MG/ML
INJECTION, SOLUTION INFILTRATION; PERINEURAL PRN
Status: DISCONTINUED | OUTPATIENT
Start: 2022-03-14 | End: 2022-03-14 | Stop reason: SDUPTHER

## 2022-03-14 RX ORDER — SODIUM CHLORIDE 9 MG/ML
INJECTION, SOLUTION INTRAVENOUS CONTINUOUS
Status: DISCONTINUED | OUTPATIENT
Start: 2022-03-14 | End: 2022-03-14 | Stop reason: HOSPADM

## 2022-03-14 RX ORDER — PROPOFOL 10 MG/ML
INJECTION, EMULSION INTRAVENOUS PRN
Status: DISCONTINUED | OUTPATIENT
Start: 2022-03-14 | End: 2022-03-14 | Stop reason: SDUPTHER

## 2022-03-14 RX ADMIN — PROPOFOL 50 MG: 10 INJECTION, EMULSION INTRAVENOUS at 11:42

## 2022-03-14 RX ADMIN — SODIUM CHLORIDE: 9 INJECTION, SOLUTION INTRAVENOUS at 10:50

## 2022-03-14 RX ADMIN — LIDOCAINE HYDROCHLORIDE 60 MG: 20 INJECTION, SOLUTION INFILTRATION; PERINEURAL at 11:42

## 2022-03-14 RX ADMIN — PROPOFOL 330 MG: 10 INJECTION, EMULSION INTRAVENOUS at 12:10

## 2022-03-14 ASSESSMENT — PAIN - FUNCTIONAL ASSESSMENT: PAIN_FUNCTIONAL_ASSESSMENT: 0-10

## 2022-03-14 ASSESSMENT — PAIN SCALES - GENERAL
PAINLEVEL_OUTOF10: 0

## 2022-03-14 NOTE — OP NOTE
[address] [place] [address]  UPPER ENDOSCOPY REPORT    DATE OF PROCEDURE: 3/14/22     SURGEON: Monae Cali M.D.    [facility] José: none    PREOPERATIVE DIAGNOSIS: epigastric pain    POSTOPERATIVE DIAGNOSIS: mild gastritis    OPERATION: Esophagogastroduodenoscopy with biopsies    ANESTHESIA: Local monitored anesthesia. (refer to Anesthesia record for details)    ESTIMATED BLOOD LOSS:  0 ml    COMPLICATIONS: None    SPECIMENS:  Was Obtained: antral biopsies    HISTORY: The patient is a 58y.o. year old female with history of above preop diagnosis. I recommended esophagogastroduodenoscopy with possible biopsy and I explained the risk, benefits, expected outcome, and alternatives to the procedure. Risks included but are not limited to bleeding, infection, respiratory distress, hypotension, and perforation of the esophagus, stomach, or duodenum. Patient understands and is in agreement. PROCEDURE: The patient was connected to the monitors and given supplemental oxygen by nasal cannula. The patient was sedated. The gastroscope was inserted orally and advanced under direct vision through the esophagus, through the stomach, through the pylorus, and into the descending duodenum. Findings:  Duodenum:     Descending: normal    Bulb: normal    Stomach:    Antrum: abnormal: mild gastritis and biopsies were taken    Body: normal    Fundus: normal    Esophagus: normal    Larynx: normal    The scope was removed and the patient tolerated the procedure well. IMPRESSION/PLAN:   1. Await biopsies  2.  Continue prilosec 20 mg daily     Monae Cali MD, [facility]  3/14/2022  12:18 PM
Cancer Screening - recommend repeat colonoscopy in 5 years      Manny Bains MD, [facility]  3/14/2022  12:19 PM

## 2022-03-14 NOTE — ANESTHESIA POSTPROCEDURE EVALUATION
Department of Anesthesiology  Postprocedure Note    Patient: Rajeev Keenan  MRN: [id number]  [place]: 1960  Date of evaluation: 3/14/2022  Time:  12:30 PM     Procedure Summary     Date: 03/14/22 Room / Location: [address] [place] [address] / [facility]    Anesthesia Start:  Anesthesia Stop:     Procedures:       COLONOSCOPY DIAGNOSTIC (N/A )      EGD DIAGNOSTIC ONLY (N/A ) Diagnosis: (EPIGASTRIC PAIN, CONSTIPATION)    Surgeons: Eve Dolan MD Responsible Provider:     Anesthesia Type: MAC ASA Status: 1          Anesthesia Type: MAC    Erin Phase I: Erin Score: 10    Erin Phase II: Erin Score: 10    Last vitals: Reviewed and per EMR flowsheets.        Anesthesia Post Evaluation    Patient location during evaluation: PACU  Patient participation: complete - patient participated  Level of consciousness: awake  Pain score: 3  Airway patency: patent  Nausea & Vomiting: no nausea and no vomiting  Complications: no  Cardiovascular status: blood pressure returned to baseline  Respiratory status: acceptable  Hydration status: euvolemic

## 2022-03-14 NOTE — H&P
[facility]    General Surgery Attending History and Physical    Patient's Name/Date of Birth: Sahra Silverman / 1960 (24 y.o.)    Date: March 14, 2022     CC:abdominal pain    HPI:  59 yo complains of epigastric pain. Today she had a sandwich and the food feels that it is stuck in the epigastric region. She notes that spicy foods burn. She feels normal around 9-10 pm after eating lunch. Pt started on omperazole 20 mg daily 1 week ago and she notes the burning is better. She moves her bowels 1-2 x per week. She has to strain. She drinks lots of water. She states  The stools are hard balls. The patient reported  acute, intermittent pain localized to the epigastrium that started 3 months. The intensity of the pain is moderate. There are no alleviating or worsening factors regarding the pain. Pt notes her bowels have been abnromal for months. SHe had a normal cologuard 2 years ago. Patient was referred to me from her daughter Katharine Goodpasture and Mary Tomlin from [employer], who she works with    Past Medical History:   Diagnosis Date    Mixed hyperlipidemia 6/12/2020       Past Surgical History:   Procedure Laterality Date    BREAST LUMPECTOMY Right     TONSILLECTOMY         Current Facility-Administered Medications   Medication Dose Route Frequency Provider Last Rate Last Admin    0.9 % sodium chloride infusion   IntraVENous Continuous Odette Sosa  mL/hr at 03/14/22 1050 New Bag at 03/14/22 1050    sodium chloride flush 0.9 % injection 5-40 mL  5-40 mL IntraVENous 2 times per day Odette Sosa MD        sodium chloride flush 0.9 % injection 5-40 mL  5-40 mL IntraVENous PRN Odette Sosa MD        0.9 % sodium chloride infusion  25 mL IntraVENous PRN Odette Sosa MD           No Known Allergies    No family history on file.     Social History     Socioeconomic History    Marital status:      Spouse name: Not on file    Number of children: Not on file    Years of education: Not on file    Highest education level: Not on file   Occupational History    Not on file   Tobacco Use    Smoking status: Never Smoker    Smokeless tobacco: Never Used   Substance and Sexual Activity    Alcohol use: Yes     Comment: occasionally    Drug use: No    Sexual activity: Not on file   Other Topics Concern    Not on file   Social History Narrative    Not on file     Social Determinants of Health     Financial Resource Strain: Low Risk     Difficulty of Paying Living Expenses: Not hard at all   Food Insecurity: No Food Insecurity    Worried About [address] in the Last Year: Never true    [facility] in the Last Year: Never true   Transportation Needs:     Lack of Transportation (Medical): Not on file    Lack of Transportation (Non-Medical):  Not on file   Physical Activity:     Days of Exercise per Week: Not on file    Minutes of Exercise per Session: Not on file   Stress:     Feeling of Stress : Not on file   Social Connections:     Frequency of Communication with Friends and Family: Not on file    Frequency of Social Gatherings with Friends and Family: Not on file    Attends Faith Services: Not on file    Active Member of [address] [place] [address] or Organizations: Not on file    Attends Club or Organization Meetings: Not on file    Marital Status: Not on file   Intimate Partner Violence:     Fear of Current or Ex-Partner: Not on file    Emotionally Abused: Not on file    Physically Abused: Not on file    Sexually Abused: Not on file   Housing Stability:     Unable to Pay for Housing in the Last Year: Not on file    Number of [place] in the Last Year: Not on file    Unstable Housing in the Last Year: Not on file       ROS:  Review of Systems - History obtained from the patient  General ROS: negative  Psychological ROS: negative  Ophthalmic ROS: negative  Allergy and Immunology ROS: negative  Hematological and Lymphatic ROS: negative  Endocrine ROS: negative  Breast ROS: negative  Respiratory ROS: negative  Cardiovascular ROS: negative  Gastrointestinal ROS: positive for - abdominal pain   Genito-Urinary ROS: negative  Musculoskeletal ROS: negative        Physical Exam:  Vitals:    03/14/22 1040   BP: 125/86   Pulse: 110   Resp: 16   Temp: 97 °F (36.1 °C)   SpO2: 95%       PSYCH: mood and affect normal, alert and oriented x 3  CONSTITUTIONAL: No apparent distress, comfortable  EYES: Sclera white, pupils equal round and reactive to light  ENMT:  Hearing normal, trachea midline, ears externally intact  LYMPH: no lympadenopathy in neck. No lympadenopathy in groins  RESP: Breath sounds were clear and equal with no rales, wheezes, or rhonchi. Respiratory effort was normal with no retractions or use of accessory muscles. CV: Heart sounds were normal with a regular rate and rhythm. No pedal edema  GI/ Abdomen: The abdomen was soft and non distended. There was no tenderness, guarding, rebound, or rigidity. There was no                     masses, hepatosplenomegaly, or hernias. No inguinal hernias were noted on coughing and straining. Rectal -deferred  MSK: no clubbing/ no cyanosis/ gait normal       Assessment/Plan:    1. Epigastric pain  Worsened by spicy foods  Pt notes foods is stuck in the pigastrium     I have reviewed the prior progress note from the patient's primary care provider visit on 2/8   I have reviewed the following test results: CBC/ BMP/ lipase, TSH.    US of the gallbladder to be done on 2/28 neg    COntinue the prilosec 20 mg daily since some improvement after 1 week of the medication    Plan on EGD to Rule out Peptic ulcer disease  I have discussed the risks, benefits, and alternatives to esophagogastroduodenoscopy with possible biopsy with deep sedation with the patient. I have detailed the risks of deep sedation (hypotension, hypoxia) as well as complications of bleeding and perforation.   The patient understands the above and agrees to proceed. 2. Constipation--  metamucil 1 tbsp bid  I discussed the risks, benefits, and alternatives to colonoscopy with possible biopsy/cauterization/polylpectomy with deep sedation with the patient including the risks of deep sedation (hypotension, hypoxia), bleeding, and perforation (<1%). The patient understands the above and agrees to proceed. 2d clears  suprep    EGD and colonoscopy--Same time    3. Obesity--BMI 30--recommend weight loss since obesity leads to worsening acid reflux and increase complications of procedures. Silvino Schmitt MD, FACS  3/14/2022  10:54 AM     NOTE: This report was transcribed using voice recognition software. Every effort was made to ensure accuracy; however, inadvertent computerized transcription errors may be present.

## 2022-03-14 NOTE — ANESTHESIA PRE PROCEDURE
Department of Anesthesiology  Preprocedure Note       Name:  Avery Klein   Age:  58 y.o.  :  1960                                          MRN:  [id number]         Date:  3/14/2022      Surgeon: Gianni Gupta):  Seng Glez MD    Procedure: Procedure(s):  COLONOSCOPY DIAGNOSTIC  EGD DIAGNOSTIC ONLY    Medications prior to admission:   Prior to Admission medications    Medication Sig Start Date End Date Taking?  Authorizing Provider   psyllium (METAMUCIL MULTIHEALTH FIBER) 58.12 % PACK packet Take 1 packet by mouth 2 times daily 2/16/22 3/18/22 Yes Seng Glez MD   bisacodyl (BISACODYL) 5 MG EC tablet Take 4 tablets twice as part of colonoscopy prep 22  Yes Seng Glez MD   omeprazole (PRILOSEC) 20 MG delayed release capsule Take 1 capsule by mouth Daily 22  Yes Aydin Diaz,        Current medications:    Current Facility-Administered Medications   Medication Dose Route Frequency Provider Last Rate Last Admin    0.9 % sodium chloride infusion   IntraVENous Continuous Seng Glez  mL/hr at 22 1050 New Bag at 22 1050    sodium chloride flush 0.9 % injection 5-40 mL  5-40 mL IntraVENous 2 times per day Seng Glez MD        sodium chloride flush 0.9 % injection 5-40 mL  5-40 mL IntraVENous PRN Seng Glez MD        0.9 % sodium chloride infusion  25 mL IntraVENous PRN Seng Glez MD           Allergies:  No Known Allergies    Problem List:    Patient Active Problem List   Diagnosis Code    Poison ivy L23.7    Dermatitis L30.9    Mixed hyperlipidemia E78.2    Fatigue R53.83    Chest pain R07.9    Fever R50.9    Loss of taste R43.2    Nausea R11.0    Diarrhea R19.7    Pleural effusion J90    Pneumonia of both lower lobes due to infectious organism J18.9    Viral stomatitis K12.1, B97.89    Pneumonia due to COVID-19 virus U07.1, J12.82    Gastroesophageal reflux disease with esophagitis without hemorrhage K21.00       Past Medical History:        Diagnosis Date    Mixed hyperlipidemia 6/12/2020       Past Surgical History:        Procedure Laterality Date    BREAST LUMPECTOMY Right     TONSILLECTOMY         Social History:    Social History     Tobacco Use    Smoking status: Never Smoker    Smokeless tobacco: Never Used   Substance Use Topics    Alcohol use: Yes     Comment: occasionally                                Counseling given: Not Answered      Vital Signs (Current):   Vitals:    03/14/22 1040   BP: 125/86   Pulse: 110   Resp: 16   Temp: 97 °F (36.1 °C)   TempSrc: Temporal   SpO2: 95%   Weight: 165 lb (74.8 kg)   Height: 5' 2\" (1.575 m)                                              BP Readings from Last 3 Encounters:   03/14/22 125/86   02/16/22 125/78   02/08/22 120/64       NPO Status: Time of last liquid consumption: 0800                        Time of last solid consumption: 1900                        Date of last liquid consumption: 03/14/22                        Date of last solid food consumption: 03/11/22    BMI:   Wt Readings from Last 3 Encounters:   03/14/22 165 lb (74.8 kg)   02/16/22 165 lb (74.8 kg)   02/08/22 164 lb (74.4 kg)     Body mass index is 30.18 kg/m².     CBC:   Lab Results   Component Value Date    WBC 5.1 02/14/2022    RBC 4.80 02/14/2022    HGB 14.1 02/14/2022    HCT 41.8 02/14/2022    MCV 87.1 02/14/2022    RDW 13.2 09/08/2021     02/14/2022       CMP:   Lab Results   Component Value Date     02/14/2022    K 5.2 02/14/2022     02/14/2022    CO2 29 02/14/2022    BUN 13 02/14/2022    CREATININE 0.81 02/14/2022    GFRAA >60 09/08/2021    LABGLOM 78 02/14/2022    LABGLOM >60 09/08/2021    GLUCOSE 89 02/14/2022    GLUCOSE 107 12/20/2011    PROT 7.5 09/08/2021    CALCIUM 9.6 02/14/2022    BILITOT 0.4 09/08/2021    ALKPHOS 86 09/08/2021    AST 30 09/08/2021    ALT 22 09/08/2021       POC Tests: No results for input(s): POCGLU, POCNA, POCK, POCCL, POCBUN, POCHEMO, POCHCT in the last 72 hours. Coags: No results found for: PROTIME, INR, APTT    HCG (If Applicable): No results found for: PREGTESTUR, PREGSERUM, HCG, HCGQUANT     ABGs: No results found for: PHART, PO2ART, VYN8UFJ, XGZ2PRB, BEART, S1QINXMC     Type & Screen (If Applicable):  No results found for: LABABO, LABRH    Drug/Infectious Status (If Applicable):  No results found for: HIV, HEPCAB    COVID-19 Screening (If Applicable): No results found for: COVID19        Anesthesia Evaluation  Patient summary reviewed no history of anesthetic complications:   Airway: Mallampati: I  TM distance: >3 FB   Neck ROM: full  Mouth opening: > = 3 FB Dental: normal exam         Pulmonary: breath sounds clear to auscultation  (+) pneumonia: resolved,                             Cardiovascular:  Exercise tolerance: good (>4 METS),           Rhythm: regular             Beta Blocker:  Not on Beta Blocker         Neuro/Psych:   Negative Neuro/Psych ROS              GI/Hepatic/Renal: Neg GI/Hepatic/Renal ROS            Endo/Other: Negative Endo/Other ROS                    Abdominal:             Vascular: negative vascular ROS. Other Findings:             Anesthesia Plan      MAC     ASA 1       Induction: intravenous. Anesthetic plan and risks discussed with patient.                       Toma Alejo MD   3/14/2022

## 2022-03-14 NOTE — PROGRESS NOTES
Discharge instructions gone over with patient and patients boyfriend all questions answered at this time

## 2022-03-21 ENCOUNTER — OFFICE VISIT (OUTPATIENT)
Dept: FAMILY MEDICINE CLINIC | Age: 62
End: 2022-03-21
Payer: COMMERCIAL

## 2022-03-21 VITALS
RESPIRATION RATE: 16 BRPM | DIASTOLIC BLOOD PRESSURE: 68 MMHG | WEIGHT: 163 LBS | HEIGHT: 62 IN | BODY MASS INDEX: 30 KG/M2 | HEART RATE: 72 BPM | OXYGEN SATURATION: 98 % | TEMPERATURE: 98.1 F | SYSTOLIC BLOOD PRESSURE: 116 MMHG

## 2022-03-21 DIAGNOSIS — R73.09 ELEVATED HEMOGLOBIN A1C: ICD-10-CM

## 2022-03-21 DIAGNOSIS — E78.2 MIXED HYPERLIPIDEMIA: Primary | ICD-10-CM

## 2022-03-21 PROCEDURE — G8484 FLU IMMUNIZE NO ADMIN: HCPCS | Performed by: FAMILY MEDICINE

## 2022-03-21 PROCEDURE — G8417 CALC BMI ABV UP PARAM F/U: HCPCS | Performed by: FAMILY MEDICINE

## 2022-03-21 PROCEDURE — 99213 OFFICE O/P EST LOW 20 MIN: CPT | Performed by: FAMILY MEDICINE

## 2022-03-21 PROCEDURE — 1036F TOBACCO NON-USER: CPT | Performed by: FAMILY MEDICINE

## 2022-03-21 PROCEDURE — 3017F COLORECTAL CA SCREEN DOC REV: CPT | Performed by: FAMILY MEDICINE

## 2022-03-21 PROCEDURE — G8427 DOCREV CUR MEDS BY ELIG CLIN: HCPCS | Performed by: FAMILY MEDICINE

## 2022-03-21 NOTE — PATIENT INSTRUCTIONS
Patient Education        High Cholesterol: Care Instructions  Overview     Cholesterol is a type of fat in your blood. It is needed for many body functions, such as making new cells. Cholesterol is made by your body. It also comes from food you eat. High cholesterol means that you have too much of the fat in your blood. This raises your risk of a heart attack and stroke. LDL and HDL are part of your total cholesterol. LDL is the \"bad\" cholesterol. High LDL can raise your risk for coronary artery disease, heart attack, and stroke. HDL is the \"good\" cholesterol. It helps clear bad cholesterol from the body. High HDL is linked with a lower risk of coronary artery disease, heart attack, and stroke. Your cholesterol levels help your doctor find out your risk for having a heart attack or stroke. You and your doctor can talk about whether you need to lower your risk and what treatment is best for you. Treatment options include a heart-healthy lifestyle and medicine. Both options can help lower your cholesterol and your risk. The way you choose to lower your risk will depend on how high your risk is for heart attack and stroke. It will also depend on how you feel about taking medicines. Follow-up care is a key part of your treatment and safety. Be sure to make and go to all appointments, and call your doctor if you are having problems. It's also a good idea to know your test results and keep a list of the medicines you take. How can you care for yourself at home? · Eat heart-healthy foods. ? Eat fruits, vegetables, whole grains, beans, and other high-fiber foods. ? Eat lean proteins, such as seafood, lean meats, beans, nuts, and soy products. ? Eat healthy fats, such as canola and olive oil. ? Choose foods that are low in saturated fat. ? Limit sodium and alcohol. ? Limit drinks and foods with added sugar. · Be physically active. Try to do moderate activity at least 2½ hours a week.  Or try to do vigorous activity at least 1¼ hours a week. You may want to walk or try other activities, such as running, swimming, cycling, or playing tennis or team sports. · Stay at a healthy weight or lose weight by making the changes in eating and physical activity listed above. Losing just a small amount of weight, even 5 to 10 pounds, can help reduce your risk for having a heart attack or stroke. · Do not smoke. · Manage other health problems. These include diabetes and high blood pressure. If you think you may have a problem with alcohol or drug use, talk to your doctor. · If you take medicine, take it exactly as prescribed. Call your doctor if you think you are having a problem with your medicine. · Check with your doctor or pharmacist before you use any other medicines, including over-the-counter medicines. Make sure your doctor knows all of the medicines, vitamins, herbal products, and supplements you take. Taking some medicines together can cause problems. When should you call for help? Watch closely for changes in your health, and be sure to contact your doctor if:    · You need help making lifestyle changes.     · You have questions about your medicine. Where can you learn more? Go to https://GigathletepeBlaBlaCareb.[employer]a Biosystems. org and sign in to your HistoRx account. Enter O332 in the [facility] box to learn more about \"High Cholesterol: Care Instructions. \"     If you do not have an account, please click on the \"Sign Up Now\" link. Current as of: April 29, 2021               Content Version: 13.1  © 2006-2021 Healthwise, [facility]. Care instructions adapted under license by [facility] ([facility]). If you have questions about a medical condition or this instruction, always ask your healthcare professional. [place] [address] any warranty or liability for your use of this information. Patient Education        Learning About High Cholesterol  What is high cholesterol?      High cholesterol means that you have too much cholesterol in your blood. Cholesterol is a type of fat. It's needed for many body functions, such as making new cells. Cholesterol is made by your body. It also comes from food you eat. Having high cholesterol can lead to the buildup of plaque in artery walls. This can increase your risk of heart attack and stroke. When your doctor talks about high cholesterol levels, your doctor is talking about your total cholesterol and LDL cholesterol (the \"bad\" cholesterol) levels. Your doctor may also speak about HDL (the \"good\" cholesterol) levels. High HDL is linked with a lower risk for coronary artery disease, heart attack, and stroke. Your cholesterol levels help your doctor find out your risk for having a heart attack or stroke. How can you help prevent high cholesterol? A heart-healthy lifestyle can help you prevent high cholesterol and lower your risk for a heart attack and stroke. · Eat heart-healthy foods. ? Eat fruits, vegetables, whole grains, beans, and other high-fiber foods. ? Eat lean proteins, such as seafood, lean meats, beans, nuts, and soy products. ? Eat healthy fats, such as canola and olive oil. ? Choose foods that are low in saturated fat. ? Limit sodium and alcohol. ? Limit drinks and foods with added sugar. · Be active. Try to do moderate activity at least 2½ hours a week. Or try vigorous activity at least 1¼ hours a week. You may want to walk or try other activities, such as running, swimming, cycling, or playing tennis or team sports. · Stay at a healthy weight. Lose weight if you need to. · Don't smoke. If you need help quitting, talk to your doctor about stop-smoking programs and medicines. These can increase your chances of quitting for good. How is high cholesterol treated? The goal of treatment is to reduce your chances of having a heart attack or stroke. The goal is not to lower your cholesterol numbers only. · Have a heart-healthy lifestyle. This includes eating healthy foods, not smoking, losing weight, and being more active. · You may choose to take medicine. Follow-up care is a key part of your treatment and safety. Be sure to make and go to all appointments, and call your doctor if you are having problems. It's also a good idea to know your test results and keep a list of the medicines you take. Where can you learn more? Go to https://AltspaceVRpeAtteroeb.Anesco. org and sign in to your eLama account. Enter U475 in the [employer] box to learn more about \"Learning About High Cholesterol. \"     If you do not have an account, please click on the \"Sign Up Now\" link. Current as of: April 29, 2021               Content Version: 13.1  © 7746-1755 Healthwise, [employer]orporated. Care instructions adapted under license by [facility] ([facility]). If you have questions about a medical condition or this instruction, always ask your healthcare professional. [place] [address] any warranty or liability for your use of this information.

## 2022-03-24 ASSESSMENT — ENCOUNTER SYMPTOMS
ABDOMINAL DISTENTION: 0
COUGH: 0
EYE ITCHING: 0
ALLERGIC/IMMUNOLOGIC NEGATIVE: 1
COLOR CHANGE: 0
TROUBLE SWALLOWING: 0
SINUS PRESSURE: 0
VOICE CHANGE: 0
CHEST TIGHTNESS: 0
RESPIRATORY NEGATIVE: 1
SHORTNESS OF BREATH: 0
EYE DISCHARGE: 0
FACIAL SWELLING: 0
EYES NEGATIVE: 1
EYE PAIN: 0
BLOOD IN STOOL: 0
PHOTOPHOBIA: 0
ANAL BLEEDING: 0
SINUS PAIN: 0
SORE THROAT: 0
STRIDOR: 0
APNEA: 0
BACK PAIN: 0
RHINORRHEA: 0
EYE REDNESS: 0
WHEEZING: 0
CHOKING: 0
RECTAL PAIN: 0

## 2022-03-24 NOTE — PROGRESS NOTES
SUBJECTIVE  Raul Zepeda is a 58 y.o. female. HPI/Chief C/O:  Chief Complaint   Patient presents with    Results     Pt here to review 3/14 colonoscopy and endoscopy and 2/14 labs. Pt is concerned about cholesterol. No Known Allergies    This 58year old female presents with hyperlipidemia, and elevated A1C. Pt has colonoscopy on 3/14/2022. LDL is 129, and A1C is 5.6.   ROS:  Review of Systems   Constitutional: Positive for fatigue. Negative for activity change, appetite change, chills, diaphoresis and unexpected weight change. HENT: Negative. Negative for congestion, dental problem, drooling, ear discharge, ear pain, facial swelling, hearing loss, mouth sores, nosebleeds, postnasal drip, rhinorrhea, sinus pressure, sinus pain, sneezing, sore throat, tinnitus, trouble swallowing and voice change. Eyes: Negative. Negative for photophobia, pain, discharge, redness, itching and visual disturbance. Respiratory: Negative. Negative for apnea, cough, choking, chest tightness, shortness of breath, wheezing and stridor. Cardiovascular: Negative. Negative for chest pain, palpitations and leg swelling. Gastrointestinal: Negative for abdominal distention, anal bleeding, blood in stool and rectal pain. Endocrine: Negative. Negative for cold intolerance, heat intolerance, polydipsia, polyphagia and polyuria. Genitourinary: Negative. Negative for decreased urine volume, difficulty urinating, dyspareunia, enuresis, flank pain, genital sores, menstrual problem, pelvic pain and urgency. Musculoskeletal: Negative. Negative for back pain, gait problem, joint swelling, neck pain and neck stiffness. Skin: Negative for color change, pallor, rash and wound. Allergic/Immunologic: Negative. Neurological: Negative. Negative for dizziness, tremors, seizures, syncope, facial asymmetry, speech difficulty, weakness, light-headedness, numbness and headaches. Hematological: Negative.   Negative for adenopathy. Does not bruise/bleed easily. Psychiatric/Behavioral: Negative for agitation, behavioral problems, confusion, decreased concentration, dysphoric mood, hallucinations, self-injury, sleep disturbance and suicidal ideas. The patient is nervous/anxious. The patient is not hyperactive. Past Medical/Surgical Hx;  Reviewed with patient      Diagnosis Date    Mixed hyperlipidemia 6/12/2020     Past Surgical History:   Procedure Laterality Date    BREAST LUMPECTOMY Right     COLONOSCOPY N/A 3/14/2022    COLONOSCOPY POLYPECTOMY SNARE/COLD BIOPSY performed by Vidhi Phillips MD at [facility] N/A 3/14/2022    EGD BIOPSY performed by Vidhi Phillips MD at [facility] ENDOSCOPY       Past Family Hx:  Reviewed with patient  History reviewed. No pertinent family history. Social Hx:  Reviewed with patient  Social History     Tobacco Use    Smoking status: Never Smoker    Smokeless tobacco: Never Used   Substance Use Topics    Alcohol use: Yes     Comment: occasionally       OBJECTIVE  /68   Pulse 72   Temp 98.1 °F (36.7 °C)   Resp 16   Ht 5' 2\" (1.575 m)   Wt 163 lb (73.9 kg)   LMP 06/10/2016 (Exact Date)   SpO2 98%   Breastfeeding No   BMI 29.81 kg/m²     Problem List:  Philomena Delaney does not have any pertinent problems on file. PHYS EX:  Physical Exam  Vitals and nursing note reviewed. Constitutional:       General: She is not in acute distress. Appearance: Normal appearance. She is well-developed. She is obese. She is not ill-appearing, toxic-appearing or diaphoretic. Comments: Patient has morbid obesity. Patient instructed on low calorie, healthy diet. HENT:      Head: Normocephalic and atraumatic. Right Ear: Tympanic membrane, ear canal and external ear normal.      Left Ear: Tympanic membrane, ear canal and external ear normal.      Nose: Nose normal. No congestion or rhinorrhea.       Mouth/Throat: Mouth: Mucous membranes are moist.      Pharynx: Oropharynx is clear. No oropharyngeal exudate or posterior oropharyngeal erythema. Eyes:      General: No scleral icterus. Right eye: No discharge. Left eye: No discharge. Conjunctiva/sclera: Conjunctivae normal.      Pupils: Pupils are equal, round, and reactive to light. Neck:      Thyroid: No thyromegaly. Vascular: No carotid bruit or JVD. Trachea: No tracheal deviation. Cardiovascular:      Rate and Rhythm: Normal rate and regular rhythm. Pulses: Normal pulses. Heart sounds: Normal heart sounds. No murmur heard. No friction rub. No gallop. Pulmonary:      Effort: Pulmonary effort is normal. No respiratory distress. Breath sounds: Normal breath sounds. No stridor. No wheezing, rhonchi or rales. Chest:      Chest wall: No tenderness. Abdominal:      General: Bowel sounds are normal. There is no distension. Palpations: Abdomen is soft. There is no mass. Tenderness: There is no abdominal tenderness. There is no guarding or rebound. Hernia: No hernia is present. Musculoskeletal:         General: No swelling, tenderness, deformity or signs of injury. Normal range of motion. Cervical back: Normal range of motion and neck supple. No rigidity. No muscular tenderness. Right lower leg: No edema. Left lower leg: No edema. Lymphadenopathy:      Cervical: No cervical adenopathy. Skin:     General: Skin is warm. Coloration: Skin is not jaundiced or pale. Findings: No bruising, erythema, lesion or rash. Comments:                                                                Neurological:      General: No focal deficit present. Mental Status: She is alert and oriented to person, place, and time. Cranial Nerves: No cranial nerve deficit. Sensory: No sensory deficit. Motor: No weakness or abnormal muscle tone.       Coordination: Coordination normal. Gait: Gait normal.      Deep Tendon Reflexes: Reflexes are normal and symmetric. Reflexes normal.   Psychiatric:         Mood and Affect: Mood normal.         Behavior: Behavior normal.         Thought Content: Thought content normal.         Judgment: Judgment normal.         ASSESSMENT/PLAN  Hakan Santos was seen today for results. Diagnoses and all orders for this visit:    Mixed hyperlipidemia  Not controlled. Low chol. Diet. Pt refuses statin at this time. Elevated hemoglobin A1c  Stable. Pt instructed on ADA diet. Pt instructed if any worse go ED ASAP. Outpatient Encounter Medications as of 3/21/2022   Medication Sig Dispense Refill    omeprazole (PRILOSEC) 20 MG delayed release capsule Take 1 capsule by mouth Daily 90 capsule 1     No facility-administered encounter medications on file as of 3/21/2022. Return in about 3 months (around 6/21/2022).         Reviewed recent labs related to Celia current problems      Discussed importance of regular Health Maintenance follow up  Health Maintenance   Topic    Hepatitis C screen     DTaP/Tdap/Td vaccine (1 - Tdap)    Cervical cancer screen     Shingles Vaccine (1 of 2)    COVID-19 Vaccine (2 - Booster for Trovebox series)    Flu vaccine (1)    Breast cancer screen     Depression Screen     Lipid screen     Colorectal Cancer Screen     HIV screen     Hepatitis A vaccine     Hepatitis B vaccine     Hib vaccine     Meningococcal (ACWY) vaccine     Pneumococcal 0-64 years Vaccine

## 2022-10-19 ENCOUNTER — OFFICE VISIT (OUTPATIENT)
Dept: SURGERY | Age: 62
End: 2022-10-19
Payer: COMMERCIAL

## 2022-10-19 VITALS
HEIGHT: 62 IN | TEMPERATURE: 98.7 F | DIASTOLIC BLOOD PRESSURE: 81 MMHG | BODY MASS INDEX: 30 KG/M2 | SYSTOLIC BLOOD PRESSURE: 127 MMHG | WEIGHT: 163 LBS

## 2022-10-19 DIAGNOSIS — R10.13 EPIGASTRIC PAIN: Primary | ICD-10-CM

## 2022-10-19 DIAGNOSIS — K59.01 SLOW TRANSIT CONSTIPATION: ICD-10-CM

## 2022-10-19 PROCEDURE — 3017F COLORECTAL CA SCREEN DOC REV: CPT | Performed by: SURGERY

## 2022-10-19 PROCEDURE — G8427 DOCREV CUR MEDS BY ELIG CLIN: HCPCS | Performed by: SURGERY

## 2022-10-19 PROCEDURE — G8417 CALC BMI ABV UP PARAM F/U: HCPCS | Performed by: SURGERY

## 2022-10-19 PROCEDURE — 99213 OFFICE O/P EST LOW 20 MIN: CPT | Performed by: SURGERY

## 2022-10-19 PROCEDURE — 1036F TOBACCO NON-USER: CPT | Performed by: SURGERY

## 2022-10-19 PROCEDURE — G8484 FLU IMMUNIZE NO ADMIN: HCPCS | Performed by: SURGERY

## 2022-10-19 RX ORDER — OMEPRAZOLE 40 MG/1
40 CAPSULE, DELAYED RELEASE ORAL
Qty: 30 CAPSULE | Refills: 5 | Status: SHIPPED | OUTPATIENT
Start: 2022-10-19

## 2022-10-19 NOTE — PROGRESS NOTES
[address] [facility]  General Surgery Attending Progress Note    Chief Complaint:  epigastric pain       Subjective:   Pt takes prilosec 20 mg daily. She complains of epigastric pain 1 x per day. Sphagetti sauce, some greasy foods cause a mild pain, some salad dressings cause the pain. No nausea/ vomiting. No fevers. No chills    Pt moves her bowels every other day. Pt is currently taking 1 fiber tablet daily. I have reviewed and confirmed the past medical history, surgical history, social history, allergies in the chart. Medications: I have reviewed the medication list in the chart. Review of Systems - History obtained from the patient  General ROS: negative  Psychological ROS: negative  Ophthalmic ROS: negative  Allergy and Immunology ROS: negative  Hematological and Lymphatic ROS: negative  Endocrine ROS: negative  Breast ROS: negative  Respiratory ROS: negative  Cardiovascular ROS: negative  Gastrointestinal ROS: positive for - abdominal pain   Genito-Urinary ROS: negative  Musculoskeletal ROS: negative      Objective:     Vitals:    10/19/22 1421   BP: 127/81   Temp: 98.7 °F (37.1 °C)    PHYSICAL EXAM   PSYCH: mood and affect normal, alert and oriented x 3  CONSTITUTIONAL: No apparent distress, comfortable  EYES: Sclera white, pupils equal round and reactive to light  ENMT:  Hearing normal, trachea midline, ears externally intact  RESP: Breath sounds were clear and equal with no rales, wheezes, or rhonchi. Respiratory effort was normal with no retractions or use of accessory muscles. CV: Heart sounds were normal with a regular rate and rhythm. No pedal edema  GI/ Abdomen: The abdomen was soft and non distended. There was no tenderness, guarding, rebound, or rigidity. There was no                     masses, hepatosplenomegaly, or hernias.   MSK: no clubbing/ no cyanosis/ gait normal        Assessment:     Patient Active Problem List   Diagnosis    Poison ivy Dermatitis    Mixed hyperlipidemia    Fatigue    Chest pain    Fever    Loss of taste    Nausea    Diarrhea    Pleural effusion    Pneumonia of both lower lobes due to infectious organism    Viral stomatitis    Pneumonia due to COVID-19 virus    Gastroesophageal reflux disease with esophagitis without hemorrhage    Acute superficial gastritis without hemorrhage         Plan:   Epigastric pain--prilosec 20 mg daily. Still has ongoing epigastric pain. Will increase prilosec to 40 mg daily    Constipation--recent cscope on 3/14/2022 showed tubular adenoma and redundant sigmoid. Repeat c scope in 5 years  Recommend increasing fiber tablet to bid    Follow up in 4-6 weeks        Jevon Liu MD, FACS  10/19/2022  2:51 PM      NOTE: This report was transcribed using voice recognition software. Every effort was made to ensure accuracy; however, inadvertent computerized transcription errors may be present.

## 2022-10-19 NOTE — PATIENT INSTRUCTIONS
Increase omeprazole to 40 mg daily   New prescription sent to [facility]    Increase fiber tablet to 2 x per day

## 2022-11-28 ENCOUNTER — TELEPHONE (OUTPATIENT)
Dept: SURGERY | Age: 62
End: 2022-11-28

## 2022-11-28 NOTE — TELEPHONE ENCOUNTER
Pt called in stating she received a phone call to pre-reg or surgery on 11/29/22. Pt stated she is not scheduled for surgery tomorrow. She believes the call was made in error. Alba Munoz can be reached at [phone].

## 2023-01-12 ENCOUNTER — APPOINTMENT (OUTPATIENT)
Dept: CT IMAGING | Age: 63
End: 2023-01-12
Payer: COMMERCIAL

## 2023-01-12 ENCOUNTER — APPOINTMENT (OUTPATIENT)
Dept: GENERAL RADIOLOGY | Age: 63
End: 2023-01-12
Payer: COMMERCIAL

## 2023-01-12 ENCOUNTER — HOSPITAL ENCOUNTER (EMERGENCY)
Age: 63
Discharge: HOME | End: 2023-01-12
Attending: EMERGENCY MEDICINE
Payer: COMMERCIAL

## 2023-01-12 VITALS
OXYGEN SATURATION: 98 % | WEIGHT: 160 LBS | SYSTOLIC BLOOD PRESSURE: 109 MMHG | HEART RATE: 65 BPM | TEMPERATURE: 97.8 F | BODY MASS INDEX: 30.21 KG/M2 | DIASTOLIC BLOOD PRESSURE: 70 MMHG | HEIGHT: 61 IN | RESPIRATION RATE: 16 BRPM

## 2023-01-12 DIAGNOSIS — R00.2 PALPITATIONS: ICD-10-CM

## 2023-01-12 DIAGNOSIS — N39.0 URINARY TRACT INFECTION WITHOUT HEMATURIA, SITE UNSPECIFIED: Primary | ICD-10-CM

## 2023-01-12 LAB
ALBUMIN SERPL-MCNC: 4.5 G/DL (ref 3.5–5.2)
ALP BLD-CCNC: 86 U/L (ref 35–104)
ALT SERPL-CCNC: 27 U/L (ref 0–32)
ANION GAP SERPL CALCULATED.3IONS-SCNC: 14 MMOL/L (ref 7–16)
AST SERPL-CCNC: 27 U/L (ref 0–31)
BACTERIA: ABNORMAL /HPF
BASOPHILS ABSOLUTE: 0.03 E9/L (ref 0–0.2)
BASOPHILS RELATIVE PERCENT: 0.6 % (ref 0–2)
BILIRUB SERPL-MCNC: 0.5 MG/DL (ref 0–1.2)
BILIRUBIN URINE: NEGATIVE
BLOOD, URINE: NEGATIVE
BUN BLDV-MCNC: 19 MG/DL (ref 6–23)
CALCIUM SERPL-MCNC: 9.4 MG/DL (ref 8.6–10.2)
CHLORIDE BLD-SCNC: 103 MMOL/L (ref 98–107)
CLARITY: ABNORMAL
CO2: 23 MMOL/L (ref 22–29)
COLOR: YELLOW
CREAT SERPL-MCNC: 0.8 MG/DL (ref 0.5–1)
D DIMER: 1550 NG/ML DDU
EKG ATRIAL RATE: 76 BPM
EKG P AXIS: 27 DEGREES
EKG P-R INTERVAL: 144 MS
EKG Q-T INTERVAL: 378 MS
EKG QRS DURATION: 68 MS
EKG QTC CALCULATION (BAZETT): 425 MS
EKG R AXIS: 6 DEGREES
EKG T AXIS: 46 DEGREES
EKG VENTRICULAR RATE: 76 BPM
EOSINOPHILS ABSOLUTE: 0.09 E9/L (ref 0.05–0.5)
EOSINOPHILS RELATIVE PERCENT: 1.8 % (ref 0–6)
EPITHELIAL CELLS, UA: ABNORMAL /HPF
GFR SERPL CREATININE-BSD FRML MDRD: >60 ML/MIN/1.73
GLUCOSE BLD-MCNC: 104 MG/DL (ref 74–99)
GLUCOSE URINE: NEGATIVE MG/DL
HCT VFR BLD CALC: 42.4 % (ref 34–48)
HEMOGLOBIN: 14.4 G/DL (ref 11.5–15.5)
IMMATURE GRANULOCYTES #: 0.02 E9/L
IMMATURE GRANULOCYTES %: 0.4 % (ref 0–5)
KETONES, URINE: 15 MG/DL
LEUKOCYTE ESTERASE, URINE: ABNORMAL
LYMPHOCYTES ABSOLUTE: 1.76 E9/L (ref 1.5–4)
LYMPHOCYTES RELATIVE PERCENT: 36.1 % (ref 20–42)
MAGNESIUM: 2.2 MG/DL (ref 1.6–2.6)
MCH RBC QN AUTO: 29.8 PG (ref 26–35)
MCHC RBC AUTO-ENTMCNC: 34 % (ref 32–34.5)
MCV RBC AUTO: 87.8 FL (ref 80–99.9)
MONOCYTES ABSOLUTE: 0.33 E9/L (ref 0.1–0.95)
MONOCYTES RELATIVE PERCENT: 6.8 % (ref 2–12)
NEUTROPHILS ABSOLUTE: 2.65 E9/L (ref 1.8–7.3)
NEUTROPHILS RELATIVE PERCENT: 54.3 % (ref 43–80)
NITRITE, URINE: NEGATIVE
PDW BLD-RTO: 12.9 FL (ref 11.5–15)
PH UA: 5.5 (ref 5–9)
PLATELET # BLD: 338 E9/L (ref 130–450)
PMV BLD AUTO: 9.6 FL (ref 7–12)
POTASSIUM SERPL-SCNC: 4 MMOL/L (ref 3.5–5)
PROTEIN UA: NEGATIVE MG/DL
RBC # BLD: 4.83 E12/L (ref 3.5–5.5)
RBC UA: ABNORMAL /HPF (ref 0–2)
SODIUM BLD-SCNC: 140 MMOL/L (ref 132–146)
SPECIFIC GRAVITY UA: 1.02 (ref 1–1.03)
TOTAL PROTEIN: 7.9 G/DL (ref 6.4–8.3)
TROPONIN, HIGH SENSITIVITY: 10 NG/L (ref 0–9)
TROPONIN, HIGH SENSITIVITY: 11 NG/L (ref 0–9)
TSH SERPL DL<=0.05 MIU/L-ACNC: 1.38 UIU/ML (ref 0.27–4.2)
UROBILINOGEN, URINE: 0.2 E.U./DL
WBC # BLD: 4.9 E9/L (ref 4.5–11.5)
WBC UA: >20 /HPF (ref 0–5)

## 2023-01-12 PROCEDURE — 6370000000 HC RX 637 (ALT 250 FOR IP): Performed by: EMERGENCY MEDICINE

## 2023-01-12 PROCEDURE — 99285 EMERGENCY DEPT VISIT HI MDM: CPT

## 2023-01-12 PROCEDURE — 85378 FIBRIN DEGRADE SEMIQUANT: CPT

## 2023-01-12 PROCEDURE — 83735 ASSAY OF MAGNESIUM: CPT

## 2023-01-12 PROCEDURE — 36415 COLL VENOUS BLD VENIPUNCTURE: CPT

## 2023-01-12 PROCEDURE — 6360000004 HC RX CONTRAST MEDICATION: Performed by: RADIOLOGY

## 2023-01-12 PROCEDURE — 85025 COMPLETE CBC W/AUTO DIFF WBC: CPT

## 2023-01-12 PROCEDURE — 71045 X-RAY EXAM CHEST 1 VIEW: CPT

## 2023-01-12 PROCEDURE — 84484 ASSAY OF TROPONIN QUANT: CPT

## 2023-01-12 PROCEDURE — 93005 ELECTROCARDIOGRAM TRACING: CPT | Performed by: EMERGENCY MEDICINE

## 2023-01-12 PROCEDURE — 87088 URINE BACTERIA CULTURE: CPT

## 2023-01-12 PROCEDURE — 71275 CT ANGIOGRAPHY CHEST: CPT

## 2023-01-12 PROCEDURE — 80053 COMPREHEN METABOLIC PANEL: CPT

## 2023-01-12 PROCEDURE — 84443 ASSAY THYROID STIM HORMONE: CPT

## 2023-01-12 PROCEDURE — 81001 URINALYSIS AUTO W/SCOPE: CPT

## 2023-01-12 PROCEDURE — 93010 ELECTROCARDIOGRAM REPORT: CPT | Performed by: INTERNAL MEDICINE

## 2023-01-12 RX ORDER — CEFDINIR 300 MG/1
300 CAPSULE ORAL ONCE
Status: COMPLETED | OUTPATIENT
Start: 2023-01-12 | End: 2023-01-12

## 2023-01-12 RX ORDER — CEFDINIR 300 MG/1
300 CAPSULE ORAL 2 TIMES DAILY
Qty: 20 CAPSULE | Refills: 0 | Status: SHIPPED | OUTPATIENT
Start: 2023-01-12 | End: 2023-01-22

## 2023-01-12 RX ADMIN — IOPAMIDOL 75 ML: 755 INJECTION, SOLUTION INTRAVENOUS at 13:39

## 2023-01-12 RX ADMIN — CEFDINIR 300 MG: 300 CAPSULE ORAL at 14:24

## 2023-01-12 ASSESSMENT — PAIN - FUNCTIONAL ASSESSMENT: PAIN_FUNCTIONAL_ASSESSMENT: NONE - DENIES PAIN

## 2023-01-12 NOTE — ED PROVIDER NOTES
[address] ENCOUNTER        Pt Name: Nafisa Guzmán  MRN: [id number]  [place] 1960  Date of evaluation: 1/12/2023  Provider: Zoie Howe DO  PCP: Davina Lauren DO  Note Started: 10:01 AM EST 1/12/23    CHIEF COMPLAINT       Chief Complaint   Patient presents with    Palpitations     Hx of palpitations. Started again last night. HISTORY OF PRESENT ILLNESS: 1 or more Elements   History From: patient    Limitations to history : None    Nafisa Guzmán is a 61 y.o. female who presents with palpitations in the night overnight. States it was like her heart was \"Doing somersaults' in her chest. She did get up and go to work this morning but was advised to come get checked out. She feels better now. She has been having intermittent palpitations started about 10 years ago and was seen by per PCP and told it was likely menopause. Tenderness have continued to occur intermittently since then. They usually self resolve and are not associated with other symptoms including shortness of breath, chest pain, sweating, nausea or vomiting. He has no significant medical history pertinent to this visit today. the complaint has been intermittent, moderate in severity, and worsened by nothing. Patient denies fever/chills, sore throat, cough, congestion, chest pain, shortness of breath, edema, headache, visual disturbances, focal paresthesias, focal weakness, abdominal pain, nausea, vomiting, diarrhea, constipation, dysuria, hematuria, trauma, neck or back pain, rash or other complaints. Nursing Notes were all reviewed and agreed with or any disagreements were addressed in the HPI. REVIEW OF SYSTEMS :           Positives and Pertinent negatives as per HPI.      SURGICAL HISTORY     Past Surgical History:   Procedure Laterality Date    BREAST LUMPECTOMY Right     COLONOSCOPY N/A 3/14/2022    COLONOSCOPY POLYPECTOMY SNARE/COLD BIOPSY performed by Nettie Alonso MD at [facility] N/A 3/14/2022    EGD BIOPSY performed by Nettie Alonso MD at [facility]. [address]       Discharge Medication List as of 1/12/2023  2:30 PM          ALLERGIES     Patient has no known allergies. FAMILYHISTORY     No family history on file. SOCIAL HISTORY       Social History     Tobacco Use    Smoking status: Never    Smokeless tobacco: Never   Substance Use Topics    Alcohol use: Yes     Comment: occasionally    Drug use: No       SCREENINGS        Barnes Coma Scale  Eye Opening: Spontaneous  Best Verbal Response: Oriented  Best Motor Response: Obeys commands  Barnes Coma Scale Score: 15                CIWA Assessment  BP: 109/70  Heart Rate: 65           PHYSICAL EXAM  1 or more Elements     ED Triage Vitals   BP Temp Temp Source Heart Rate Resp SpO2 Height Weight   01/12/23 0920 01/12/23 0920 01/12/23 0920 01/12/23 0920 01/12/23 0920 01/12/23 0920 01/12/23 0930 01/12/23 0930   119/83 98.2 °F (36.8 °C) Oral 71 18 99 % 5' 1\" (1.549 m) 160 lb (72.6 kg)            ----------------------------------------PHYSICAL EXAM--------------------------------------  Constitutional:  Well developed, well nourished, no acute distress, non-toxic appearance   Eyes:  PERRL, conjunctiva normal, EOMI  HENT:  Atraumatic, external ears normal, nose normal, oropharynx moist, TMs clear and intact bilaterally. Neck- normal range of motion, no nuchal rigidity   Respiratory:  No respiratory distress, normal breath sounds, no rales, no wheezing   Cardiovascular:  Normal rate, normal rhythm, no murmurs, no gallops, no rubs. Radial and DP pulses 2+ bilaterally. Compartments are soft. She is warm and well perfused. Cap refill less than 3 seconds.   GI:  Soft, nondistended, normal bowel sounds, nontender, no rebound, no guarding   :  No costovertebral angle tenderness   Musculoskeletal:  No edema, no deformities. Back- no tenderness  Integument:  Well hydrated. Adequate perfusion. Lymphatic:  No cervical lymphadenopathy noted   Neurologic:  Alert & oriented x 3, CN 2-12 normal, no focal deficits noted. Normal gait.     Psychiatric:  Speech and behavior appropriate             DIAGNOSTIC RESULTS   LABS:  Results for orders placed or performed during the hospital encounter of 01/12/23   CBC with Auto Differential   Result Value Ref Range    WBC 4.9 4.5 - 11.5 E9/L    RBC 4.83 3.50 - 5.50 E12/L    Hemoglobin 14.4 11.5 - 15.5 g/dL    Hematocrit 42.4 34.0 - 48.0 %    MCV 87.8 80.0 - 99.9 fL    MCH 29.8 26.0 - 35.0 pg    MCHC 34.0 32.0 - 34.5 %    RDW 12.9 11.5 - 15.0 fL    Platelets 532 906 - 397 E9/L    MPV 9.6 7.0 - 12.0 fL    Neutrophils % 54.3 43.0 - 80.0 %    Immature Granulocytes % 0.4 0.0 - 5.0 %    Lymphocytes % 36.1 20.0 - 42.0 %    Monocytes % 6.8 2.0 - 12.0 %    Eosinophils % 1.8 0.0 - 6.0 %    Basophils % 0.6 0.0 - 2.0 %    Neutrophils Absolute 2.65 1.80 - 7.30 E9/L    Immature Granulocytes # 0.02 E9/L    Lymphocytes Absolute 1.76 1.50 - 4.00 E9/L    Monocytes Absolute 0.33 0.10 - 0.95 E9/L    Eosinophils Absolute 0.09 0.05 - 0.50 E9/L    Basophils Absolute 0.03 0.00 - 0.20 E9/L   Comprehensive Metabolic Panel   Result Value Ref Range    Sodium 140 132 - 146 mmol/L    Potassium 4.0 3.5 - 5.0 mmol/L    Chloride 103 98 - 107 mmol/L    CO2 23 22 - 29 mmol/L    Anion Gap 14 7 - 16 mmol/L    Glucose 104 (H) 74 - 99 mg/dL    BUN 19 6 - 23 mg/dL    Creatinine 0.8 0.5 - 1.0 mg/dL    Est, Glom Filt Rate >60 >=60 mL/min/1.73    Calcium 9.4 8.6 - 10.2 mg/dL    Total Protein 7.9 6.4 - 8.3 g/dL    Albumin 4.5 3.5 - 5.2 g/dL    Total Bilirubin 0.5 0.0 - 1.2 mg/dL    Alkaline Phosphatase 86 35 - 104 U/L    ALT 27 0 - 32 U/L    AST 27 0 - 31 U/L   Magnesium   Result Value Ref Range    Magnesium 2.2 1.6 - 2.6 mg/dL   Urinalysis with Microscopic   Result Value Ref Range    Color, UA Yellow Straw/Yellow Clarity, UA SL CLOUDY Clear    Glucose, Ur Negative Negative mg/dL    Bilirubin Urine Negative Negative    Ketones, Urine 15 (A) Negative mg/dL    Specific Gravity, UA 1.025 1.005 - 1.030    Blood, Urine Negative Negative    pH, UA 5.5 5.0 - 9.0    Protein, UA Negative Negative mg/dL    Urobilinogen, Urine 0.2 <2.0 E.U./dL    Nitrite, Urine Negative Negative    Leukocyte Esterase, Urine MODERATE (A) Negative    WBC, UA >20 (A) 0 - 5 /HPF    RBC, UA NONE 0 - 2 /HPF    Epithelial Cells, UA MODERATE /HPF    Bacteria, UA MANY (A) None Seen /HPF   Troponin   Result Value Ref Range    Troponin, High Sensitivity 11 (H) 0 - 9 ng/L   D-Dimer, Quantitative   Result Value Ref Range    D-Dimer, Quant 1550 ng/mL DDU   Troponin   Result Value Ref Range    Troponin, High Sensitivity 10 (H) 0 - 9 ng/L   EKG 12 Lead   Result Value Ref Range    Ventricular Rate 76 BPM    Atrial Rate 76 BPM    P-R Interval 144 ms    QRS Duration 68 ms    Q-T Interval 378 ms    QTc Calculation (Bazett) 425 ms    P Axis 27 degrees    R Axis 6 degrees    T Axis 46 degrees       As interpreted by me, the above displayed labs are abnormal. All other labs obtained during this visit were within normal range or not returned as of this dictation.         EKG Interpretation  Interpreted by emergency department physician, Warren Nance DO  Time: 09:31 AM EDT  Rhythm: normal sinus   Rate: 76  Axis: normal  Conduction: normal  ST Segments: no acute change  T Waves: no acute change  Clinical Impression: no acute changes and low voltage QRS  Comparison to prior EKG: no previous EKG        RADIOLOGY:   Non-plain film images such as CT, Ultrasound and MRI are read by the radiologist. Plain radiographic images are visualized and preliminarily interpreted by the ED Provider with the below findings:  CXR: no acute findings    Interpretation per the Radiologist below, if available at the time of this note:    CTA PULMONARY W CONTRAST   Final Result   No evidence of pulmonary embolism or acute pulmonary abnormality. XR CHEST PORTABLE   Final Result   No acute process. No results found. No results found. PROCEDURES   Unless otherwise noted below, none          CRITICAL CARE TIME (.cct)   none    PAST MEDICAL HISTORY/Chronic Conditions Affecting Care      has a past medical history of Mixed hyperlipidemia (6/12/2020). EMERGENCY DEPARTMENT COURSE    Vitals:    Vitals:    01/12/23 0920 01/12/23 0930 01/12/23 1006 01/12/23 1424   BP: 119/83  118/72 109/70   Pulse: 71  68 65   Resp: 18  16 16   Temp: 98.2 °F (36.8 °C)  98 °F (36.7 °C) 97.8 °F (36.6 °C)   TempSrc: Oral      SpO2: 99%  97% 98%   Weight:  160 lb (72.6 kg)     Height:  5' 1\" (1.549 m)         Patient was given the following medications:  Medications   iopamidol (ISOVUE-370) 76 % injection 75 mL (75 mLs IntraVENous Given 1/12/23 1339)   cefdinir (OMNICEF) capsule 300 mg (300 mg Oral Given 1/12/23 1424)           Is this patient to be included in the SEP-1 Core Measure due to severe sepsis or septic shock? No Exclusion criteria - the patient is NOT to be included for SEP-1 Core Measure due to: 2+ SIRS criteria are not met        Medical Decision Making/Differential Diagnosis:    CC/HPI Summary, Social Determinants of health, Records Reviewed, DDx, testing done/not done, ED Course, Reassessment, disposition considerations/shared decision making with patient, consults, disposition:            MDM:   Patient presents with palpitations of longstanding time. They are intermittent. She has been having them while she is here but no evidence of arrhythmia on the monitor. Her EKG is within normal limits. 2 troponins are flat. D-dimer was mildly elevated but CTA pulm was negative for pulmonary embolism, pneumonia or pneumothorax. She was diagnosed with a UTI here per her urinalysis and when she thinks about it she says she does have some dysuria. We will treat with Omnicef.   There is no evidence of sepsis at this time. She appears well, nontoxic, neurovascularly intact and ambulatory upon discharge. She was advised that a TSH was drawn today but will not result today and will follow-up with her primary care physician regarding her thyroid test results. Differential diagnosis includes: Palpitations, PE, coronary artery disease, structural abnormality of the heart, infection, thyroid disease      Re-Evaluations:  Time: 2:34 PM EST   Re-evaluation. Patients symptoms are improving  Repeat physical examination is not changed      CONSULTS: (Who and What was discussed)  None        Counseling: The emergency provider has spoken with the patient and discussed todays results, in addition to providing specific details for the plan of care and counseling regarding the diagnosis and prognosis. Questions are answered at this time and they are agreeable with the plan. I am the Primary Clinician of Record.     --------------------------------- IMPRESSION AND DISPOSITION ---------------------------------    IMPRESSION  1. Urinary tract infection without hematuria, site unspecified    2. Palpitations        DISPOSITION  Disposition: Discharge to home  Patient condition is stable    PATIENT REFERRED TO:  Beryle Plummer, DO  [address]  [phone]    Call today  follow up on Thyroid test results;  Holter monitor    [address] [facility] Emergency Department  [facility] [address]  [phone]  Go to   If symptoms worsen    DISCHARGE MEDICATIONS:  Discharge Medication List as of 1/12/2023  2:30 PM        START taking these medications    Details   cefdinir (OMNICEF) 300 MG capsule Take 1 capsule by mouth 2 times daily for 10 days, Disp-20 capsule, R-0Normal             DISCONTINUED MEDICATIONS:  Discharge Medication List as of 1/12/2023  2:30 PM        STOP taking these medications       omeprazole (PRILOSEC) 40 MG delayed release capsule Comments:   Reason for Stopping:                      (Please note that portions of this note were completed with a voice recognition program.  Efforts were made to edit the dictations but occasionally words are mis-transcribed.)    Ashok Lewis DO (electronically signed)           Ashok Lewis DO  01/12/23 3571

## 2023-01-13 ENCOUNTER — TELEPHONE (OUTPATIENT)
Dept: CARDIOLOGY CLINIC | Age: 63
End: 2023-01-13

## 2023-01-13 ENCOUNTER — OFFICE VISIT (OUTPATIENT)
Dept: FAMILY MEDICINE CLINIC | Age: 63
End: 2023-01-13
Payer: COMMERCIAL

## 2023-01-13 VITALS
RESPIRATION RATE: 16 BRPM | OXYGEN SATURATION: 96 % | DIASTOLIC BLOOD PRESSURE: 77 MMHG | WEIGHT: 162 LBS | HEART RATE: 94 BPM | TEMPERATURE: 97.9 F | SYSTOLIC BLOOD PRESSURE: 128 MMHG | BODY MASS INDEX: 30.58 KG/M2 | HEIGHT: 61 IN

## 2023-01-13 DIAGNOSIS — N39.0 URINARY TRACT INFECTION WITHOUT HEMATURIA, SITE UNSPECIFIED: ICD-10-CM

## 2023-01-13 DIAGNOSIS — R00.2 HEART PALPITATIONS: Primary | ICD-10-CM

## 2023-01-13 PROBLEM — E66.9 MODERATE OBESITY: Status: ACTIVE | Noted: 2023-01-13

## 2023-01-13 PROBLEM — E66.8 MODERATE OBESITY: Status: ACTIVE | Noted: 2023-01-13

## 2023-01-13 PROBLEM — E78.00 PURE HYPERCHOLESTEROLEMIA: Status: ACTIVE | Noted: 2020-06-12

## 2023-01-13 PROCEDURE — G8484 FLU IMMUNIZE NO ADMIN: HCPCS | Performed by: FAMILY MEDICINE

## 2023-01-13 PROCEDURE — G8417 CALC BMI ABV UP PARAM F/U: HCPCS | Performed by: FAMILY MEDICINE

## 2023-01-13 PROCEDURE — 3017F COLORECTAL CA SCREEN DOC REV: CPT | Performed by: FAMILY MEDICINE

## 2023-01-13 PROCEDURE — G8427 DOCREV CUR MEDS BY ELIG CLIN: HCPCS | Performed by: FAMILY MEDICINE

## 2023-01-13 PROCEDURE — 1036F TOBACCO NON-USER: CPT | Performed by: FAMILY MEDICINE

## 2023-01-13 PROCEDURE — 99214 OFFICE O/P EST MOD 30 MIN: CPT | Performed by: FAMILY MEDICINE

## 2023-01-13 SDOH — ECONOMIC STABILITY: FOOD INSECURITY: WITHIN THE PAST 12 MONTHS, THE FOOD YOU BOUGHT JUST DIDN'T LAST AND YOU DIDN'T HAVE MONEY TO GET MORE.: NEVER TRUE

## 2023-01-13 SDOH — ECONOMIC STABILITY: FOOD INSECURITY: WITHIN THE PAST 12 MONTHS, YOU WORRIED THAT YOUR FOOD WOULD RUN OUT BEFORE YOU GOT MONEY TO BUY MORE.: NEVER TRUE

## 2023-01-13 ASSESSMENT — SOCIAL DETERMINANTS OF HEALTH (SDOH): HOW HARD IS IT FOR YOU TO PAY FOR THE VERY BASICS LIKE FOOD, HOUSING, MEDICAL CARE, AND HEATING?: NOT HARD AT ALL

## 2023-01-13 ASSESSMENT — PATIENT HEALTH QUESTIONNAIRE - PHQ9
SUM OF ALL RESPONSES TO PHQ QUESTIONS 1-9: 0
1. LITTLE INTEREST OR PLEASURE IN DOING THINGS: 0
SUM OF ALL RESPONSES TO PHQ QUESTIONS 1-9: 0
2. FEELING DOWN, DEPRESSED OR HOPELESS: 0
SUM OF ALL RESPONSES TO PHQ QUESTIONS 1-9: 0
SUM OF ALL RESPONSES TO PHQ QUESTIONS 1-9: 0
SUM OF ALL RESPONSES TO PHQ9 QUESTIONS 1 & 2: 0

## 2023-01-13 NOTE — TELEPHONE ENCOUNTER
Patient Appointment Form:      PCP: Dr.G. Conteh  Referring: Dr.G. Conteh    Has the Patient:    Seen a Cardiologist? no    Had a heart catheterization? no    Had heart surgery? no    Had a stress test or nuclear stress test? no    Had an echocardiogram? no    Had a vascular ultrasound? no    Had a 24/48 heart monitor or extended cardiac event monitor? no    Had recent blood work in the last 6 months? yes    date: 01/12/2023    ordering physician: Alex Barrett    Had a pacemaker/ICD/ILR implant? no    Seen an Electrophysiologist? no        Will send records via: in [address] [place] [address] Rd      Date & time of appointment: 02/02/2023 Jeanie Zhu

## 2023-01-15 LAB — URINE CULTURE, ROUTINE: NORMAL

## 2023-01-16 PROBLEM — N39.0 URINARY TRACT INFECTION WITHOUT HEMATURIA: Status: ACTIVE | Noted: 2023-01-16

## 2023-01-16 PROBLEM — R00.2 HEART PALPITATIONS: Status: ACTIVE | Noted: 2023-01-16

## 2023-01-16 ASSESSMENT — ENCOUNTER SYMPTOMS
SINUS PAIN: 0
EYE ITCHING: 0
COLOR CHANGE: 0
CHOKING: 0
SINUS PRESSURE: 0
TROUBLE SWALLOWING: 0
FACIAL SWELLING: 0
EYE REDNESS: 0
COUGH: 0
RECTAL PAIN: 0
CHEST TIGHTNESS: 0
APNEA: 0
SHORTNESS OF BREATH: 0
ALLERGIC/IMMUNOLOGIC NEGATIVE: 1
SORE THROAT: 0
VOICE CHANGE: 0
EYE DISCHARGE: 0
RESPIRATORY NEGATIVE: 1
RHINORRHEA: 0
STRIDOR: 0
ANAL BLEEDING: 0
EYES NEGATIVE: 1
PHOTOPHOBIA: 0
EYE PAIN: 0
BLOOD IN STOOL: 0
BACK PAIN: 0
WHEEZING: 0
ABDOMINAL DISTENTION: 0

## 2023-01-16 NOTE — PROGRESS NOTES
SUBJECTIVE  Ilda Garcia is a 61 y.o. female. HPI/Chief C/O:  Chief Complaint   Patient presents with    Follow-up     Pt was in ED 1/12/23 for uti and heart palpatations. No Known Allergies    This 61year old female presents for ED follow up from 1/12/2023 for heart palpitations. EKG, CTA chest, and chest xray are negative. Pt has UTI on antibiotic. Pt states her heart feels like it twists in chest. Pt stat this has happened 10 year ago and is worse for last few weeks. Pt denies shortness of breath. ROS:  Review of Systems   Constitutional:  Positive for fatigue. Negative for activity change, appetite change, chills, diaphoresis and unexpected weight change. HENT: Negative. Negative for congestion, dental problem, drooling, ear discharge, ear pain, facial swelling, hearing loss, mouth sores, nosebleeds, postnasal drip, rhinorrhea, sinus pressure, sinus pain, sneezing, sore throat, tinnitus, trouble swallowing and voice change. Eyes: Negative. Negative for photophobia, pain, discharge, redness, itching and visual disturbance. Respiratory: Negative. Negative for apnea, cough, choking, chest tightness, shortness of breath, wheezing and stridor. Cardiovascular:  Positive for palpitations. Negative for chest pain and leg swelling. Gastrointestinal:  Negative for abdominal distention, anal bleeding, blood in stool and rectal pain. Endocrine: Negative. Negative for cold intolerance, heat intolerance, polydipsia, polyphagia and polyuria. Genitourinary:  Positive for dysuria, frequency and urgency. Negative for decreased urine volume, difficulty urinating, dyspareunia, enuresis, flank pain, genital sores, menstrual problem and pelvic pain. Musculoskeletal: Negative. Negative for back pain, gait problem, joint swelling, neck pain and neck stiffness. Skin:  Negative for color change, pallor, rash and wound. Allergic/Immunologic: Negative. Neurological: Negative.   Negative for dizziness, tremors, seizures, syncope, facial asymmetry, speech difficulty, weakness, light-headedness, numbness and headaches.   Hematological: Negative.  Negative for adenopathy. Does not bruise/bleed easily.   Psychiatric/Behavioral:  Negative for agitation, behavioral problems, confusion, decreased concentration, dysphoric mood, hallucinations, self-injury, sleep disturbance and suicidal ideas. The patient is nervous/anxious. The patient is not hyperactive.       Past Medical/Surgical Hx;  Reviewed with patient      Diagnosis Date    Mixed hyperlipidemia 6/12/2020    Moderate obesity 1/13/2023     Past Surgical History:   Procedure Laterality Date    BREAST LUMPECTOMY Right     COLONOSCOPY N/A 3/14/2022    COLONOSCOPY POLYPECTOMY SNARE/COLD BIOPSY performed by Kyrie Ronquillo MD at [facility] [place] [facility]    TONSILLECTOMY      UPPER GASTROINTESTINAL ENDOSCOPY N/A 3/14/2022    EGD BIOPSY performed by Kyrie Ronquillo MD at [facility] [place] [facility]       Past Family Hx:  Reviewed with patient  No family history on file.    Social Hx:  Reviewed with patient  Social History     Tobacco Use    Smoking status: Never    Smokeless tobacco: Never   Substance Use Topics    Alcohol use: Yes     Comment: occasionally       OBJECTIVE  /77   Pulse 94   Temp 97.9 °F (36.6 °C)   Resp 16   Ht 5' 1\" (1.549 m)   Wt 162 lb (73.5 kg)   LMP 06/10/2016 (Exact Date)   SpO2 96%   BMI 30.61 kg/m²     Problem List:  Aparna does not have any pertinent problems on file.    PHYS EX:  Physical Exam  Vitals and nursing note reviewed.   Constitutional:       General: She is not in acute distress.     Appearance: Normal appearance. She is well-developed. She is obese. She is not ill-appearing, toxic-appearing or diaphoretic.      Comments: Patient has morbid obesity. Patient instructed on low calorie, healthy diet.      HENT:      Head: Normocephalic and atraumatic.      Right Ear: Tympanic membrane, ear canal and external ear normal.      Left  Ear: Tympanic membrane, ear canal and external ear normal.      Nose: Nose normal. No congestion or rhinorrhea. Mouth/Throat:      Mouth: Mucous membranes are moist.      Pharynx: Oropharynx is clear. No oropharyngeal exudate or posterior oropharyngeal erythema. Eyes:      General: No scleral icterus. Right eye: No discharge. Left eye: No discharge. Conjunctiva/sclera: Conjunctivae normal.      Pupils: Pupils are equal, round, and reactive to light. Neck:      Thyroid: No thyromegaly. Vascular: No carotid bruit or JVD. Trachea: No tracheal deviation. Cardiovascular:      Rate and Rhythm: Normal rate and regular rhythm. Pulses: Normal pulses. Heart sounds: Normal heart sounds. No murmur heard. No friction rub. No gallop. Pulmonary:      Effort: Pulmonary effort is normal. No respiratory distress. Breath sounds: Normal breath sounds. No stridor. No wheezing, rhonchi or rales. Chest:      Chest wall: No tenderness. Abdominal:      General: Bowel sounds are normal. There is no distension. Palpations: Abdomen is soft. There is no mass. Tenderness: There is no abdominal tenderness. There is no guarding or rebound. Hernia: No hernia is present. Musculoskeletal:         General: No swelling, tenderness, deformity or signs of injury. Normal range of motion. Cervical back: Normal range of motion and neck supple. No rigidity or tenderness. No muscular tenderness. Right lower leg: No edema. Left lower leg: No edema. Lymphadenopathy:      Cervical: No cervical adenopathy. Skin:     General: Skin is warm. Coloration: Skin is not jaundiced or pale. Findings: No bruising, erythema, lesion or rash. Comments:                                                                Neurological:      General: No focal deficit present. Mental Status: She is alert and oriented to person, place, and time. Cranial Nerves:  No cranial nerve deficit. Sensory: No sensory deficit. Motor: No weakness or abnormal muscle tone. Coordination: Coordination normal.      Gait: Gait normal.      Deep Tendon Reflexes: Reflexes are normal and symmetric. Reflexes normal.   Psychiatric:         Mood and Affect: Mood normal.         Behavior: Behavior normal.         Thought Content: Thought content normal.         Judgment: Judgment normal.       ASSESSMENT/PLAN  Roberta Ratliff was seen today for follow-up. Diagnoses and all orders for this visit:    Heart palpitations  -     Shannon Saucedo MD, Cardiology, [facility]    Urinary tract infection without hematuria, site unspecified  Not controlled. Omnicef, Patient instructed on treatment and prevention UTI. Pt instructed if any worse go ED ASAP. Outpatient Encounter Medications as of 1/13/2023   Medication Sig Dispense Refill    cefdinir (OMNICEF) 300 MG capsule Take 1 capsule by mouth 2 times daily for 10 days 20 capsule 0     No facility-administered encounter medications on file as of 1/13/2023.        Return for after specialist.        Reviewed recent labs related to Aparna's current problems      Discussed importance of regular Health Maintenance follow up  Health Maintenance   Topic    Hepatitis C screen     DTaP/Tdap/Td vaccine (1 - Tdap)    Cervical cancer screen     Shingles vaccine (1 of 2)    COVID-19 Vaccine (3 - Booster for [employer] series)    Flu vaccine (1)    Breast cancer screen     Depression Screen     Diabetes screen     Lipids     Colorectal Cancer Screen     HIV screen     Hepatitis A vaccine     Hib vaccine     Meningococcal (ACWY) vaccine     Pneumococcal 0-64 years Vaccine

## 2023-02-02 ENCOUNTER — OFFICE VISIT (OUTPATIENT)
Dept: CARDIOLOGY CLINIC | Age: 63
End: 2023-02-02
Payer: COMMERCIAL

## 2023-02-02 VITALS
HEIGHT: 61 IN | OXYGEN SATURATION: 99 % | DIASTOLIC BLOOD PRESSURE: 72 MMHG | RESPIRATION RATE: 16 BRPM | WEIGHT: 155.2 LBS | SYSTOLIC BLOOD PRESSURE: 120 MMHG | BODY MASS INDEX: 29.3 KG/M2 | HEART RATE: 68 BPM

## 2023-02-02 DIAGNOSIS — E78.00 PURE HYPERCHOLESTEROLEMIA: ICD-10-CM

## 2023-02-02 DIAGNOSIS — R00.2 PALPITATIONS: Primary | ICD-10-CM

## 2023-02-02 PROCEDURE — G8417 CALC BMI ABV UP PARAM F/U: HCPCS | Performed by: INTERNAL MEDICINE

## 2023-02-02 PROCEDURE — G8427 DOCREV CUR MEDS BY ELIG CLIN: HCPCS | Performed by: INTERNAL MEDICINE

## 2023-02-02 PROCEDURE — 99244 OFF/OP CNSLTJ NEW/EST MOD 40: CPT | Performed by: INTERNAL MEDICINE

## 2023-02-02 PROCEDURE — G8484 FLU IMMUNIZE NO ADMIN: HCPCS | Performed by: INTERNAL MEDICINE

## 2023-02-02 PROCEDURE — 93000 ELECTROCARDIOGRAM COMPLETE: CPT | Performed by: INTERNAL MEDICINE

## 2023-02-02 RX ORDER — OMEPRAZOLE 40 MG/1
40 CAPSULE, DELAYED RELEASE ORAL DAILY
COMMUNITY

## 2023-02-02 NOTE — PROGRESS NOTES
OUTPATIENT CARDIOLOGY CONSULT    Name: Erika Hannah    Age: 61 y.o. Date of Service: 2/2/2023    Reason for Consultation: Palpitations    Referring Physician: Alan Watts DO    History of Present Illness: The patient is a 80-year-old white female with no known structural heart disease and a risk profile limited to that of hyperlipidemia. She is normally active with functional capabilities of approximately 5 METs and no symptoms of anginal-like chest discomfort or other ischemic equivalents, decompensated left ventricular systolic dysfunction or volume overload nor arrhythmia related manifestations. She has a longstanding history of palpitations described as the sensation of a \"bubble\" like sensation in her chest and when present the feeling of her \"heart doing somersaults in my chest\" without associated symptoms of chest discomfort, dyspnea, diaphoresis, lightheadedness, near syncope or loss of consciousness. On the basis of a severe episode within the past month occurring nocturnally, she was evaluated in an urgent care center with a resting electrocardiogram reviewed at the time of evaluation demonstrating evidence of sinus rhythm and otherwise unremarkable and a chest x-ray obtained to see  film for a contrast CT angiogram and again reviewed demonstrating no evidence of cardiomegaly or infiltrate and with the contrast CT angiogram otherwise unremarkable with normal high-sensitivity troponin levels and no metabolic derangements. During her assessment, no significant arrhythmias were reported. Review of Systems: The remainder of a complete multisystem review including consitutional, central nervous, respiratory, circulatory, gastrointestinal, genitourinary, endocrinologic, hematologic, musculoskeletal and psychiatric are negative.     Past Medical History:  Past Medical History:   Diagnosis Date    Mixed hyperlipidemia 6/12/2020    Moderate obesity 1/13/2023       Past Surgical History:  Past Surgical History:   Procedure Laterality Date    BREAST LUMPECTOMY Right     COLONOSCOPY N/A 3/14/2022    COLONOSCOPY POLYPECTOMY SNARE/COLD BIOPSY performed by Arianna Bean MD at [facility] N/A 3/14/2022    EGD BIOPSY performed by Arianna Bean MD at [facility]       Family History:  No family history on file. Social History:  Social History     Socioeconomic History    Marital status:      Spouse name: Not on file    Number of children: Not on file    Years of education: Not on file    Highest education level: Not on file   Occupational History    Not on file   Tobacco Use    Smoking status: Never    Smokeless tobacco: Never   Substance and Sexual Activity    Alcohol use: Yes     Comment: occasionally    Drug use: No    Sexual activity: Not on file   Other Topics Concern    Not on file   Social History Narrative    Not on file     Social Determinants of Health     Financial Resource Strain: Low Risk     Difficulty of Paying Living Expenses: Not hard at all   Food Insecurity: No Food Insecurity    Worried About Running Out of Food in the Last Year: Never true    Ran Out of Food in the Last Year: Never true   Transportation Needs: Not on file   Physical Activity: Not on file   Stress: Not on file   Social Connections: Not on file   Intimate Partner Violence: Not on file   Housing Stability: Not on file       Allergies:  No Known Allergies    Current Medications:  Current Outpatient Medications   Medication Sig Dispense Refill    omeprazole (PRILOSEC) 40 MG delayed release capsule Take 40 mg by mouth daily      FIBER COMPLETE PO Take by mouth daily       No current facility-administered medications for this visit.          Physical Exam:  /72 (Site: Left Upper Arm, Position: Sitting, Cuff Size: Medium Adult)   Pulse 68   Resp 16   Ht 5' 1\" (1.549 m)   Wt 155 lb 3.2 oz (70.4 kg)   LMP 06/10/2016 (Exact Date) SpO2 99%   BMI 29.32 kg/m²   Wt Readings from Last 3 Encounters:   02/02/23 155 lb 3.2 oz (70.4 kg)   01/13/23 162 lb (73.5 kg)   01/12/23 160 lb (72.6 kg)     The patient is awake, alert and in no discomfort or distress. No gross musculoskeletal deformity or lymphadenopathy are present. No significant skin or nail changes are present. Gross examination of head, eyes, nose and throat are negative. Jugular venous pressure is normal and no carotid bruits are present. No thyromegaly is noted. Normal respiratory effort is noted with no accessory muscle usage present. Lung fields are clear to ascultation. Cardiac examination is notable for a regular rate and rhythm with no palpable thrill. No gallop rhythm or cardiac murmur are identified. A benign abdominal examination is present with no masses or organomegaly. A normal abdominal aortic pulsation is present. Intact pulses are present throughout all extremities and no peripheral edema is present. No focal neurologic deficits are present. Laboratory Tests:  Lab Results   Component Value Date    CREATININE 0.8 01/12/2023    BUN 19 01/12/2023     01/12/2023    K 4.0 01/12/2023     01/12/2023    CO2 23 01/12/2023     No results found for: BNP  Lab Results   Component Value Date/Time    WBC 4.9 01/12/2023 10:11 AM    RBC 4.83 01/12/2023 10:11 AM    HGB 14.4 01/12/2023 10:11 AM    HCT 42.4 01/12/2023 10:11 AM    MCV 87.8 01/12/2023 10:11 AM    MCH 29.8 01/12/2023 10:11 AM    MCHC 34.0 01/12/2023 10:11 AM    RDW 12.9 01/12/2023 10:11 AM     01/12/2023 10:11 AM    MPV 9.6 01/12/2023 10:11 AM     No results for input(s): ALKPHOS, ALT, AST, PROT, BILITOT, BILIDIR, LABALBU in the last 72 hours.   Lab Results   Component Value Date/Time    MG 2.2 01/12/2023 10:11 AM     No results found for: PROTIME, INR  Lab Results   Component Value Date/Time    TSH 1.380 01/12/2023 10:11 AM     No components found for: CHLPL  Lab Results   Component Value Date    TRIG 95 02/14/2022    TRIG 70 09/08/2021    TRIG 71 06/10/2020     Lab Results   Component Value Date    HDL 60 02/14/2022    HDL 57 09/08/2021    HDL 59 06/10/2020     Lab Results   Component Value Date    LDLCALC 129 02/14/2022    LDLCALC 113 (H) 09/08/2021    LDLCALC 116 (H) 06/10/2020       Cardiac Tests:  ECG: A resting electrocardiogram demonstrates evidence of sinus rhythm with nonspecific ST changes  Chest X-ray: A chest x-ray obtained is the  film for a contrast CT angiogram of January, 2023 reviewed at the time of evaluation demonstrated no evidence of cardiomegaly or infiltrate      ASSESSMENT / PLAN: On a clinical basis, the patient presents with palpitations of a benign nature and in the absence of associated symptoms with no electrocardiographic documentation of abnormality in the face of symptoms. I have extensively discussed this with her and feel that symptomatology is unlikely to be of a cardiovascular origin and would not recommend additional evaluation not contributing to alteration of management. She will benefit from appropriate lifestyle modification to achieve weight reduction the benefit of diastolic cardiac performance and reducing risk of the development of obstructive sleep apnea with associated adverse cardiovascular effects. Ongoing appropriate risk factor modification of blood pressure and serum lipids will remain essential to reducing risk of future atherosclerotic development. I will presently return her to your care with the above reference recommendations and would happily reevaluate her in the future should additional cardiovascular difficulties or concerns arise. Follow-up office visit as needed should additional cardiovascular difficulties or concerns arise. Thank you for allowing me to participate in your patient's care. Please feel free to contact me if you have any questions or concerns.     Note: This report was completed utilizing a computerized voice recognition software. Every effort has been made to insure accuracy, however; inadvertent computerized transcription errors may be present. Luis Aguirre.  Jeni Salas, [facility]    An electronic copy of this consult note was forwarded to Dr. Yosi Lopes

## 2023-02-10 ENCOUNTER — OFFICE VISIT (OUTPATIENT)
Dept: FAMILY MEDICINE CLINIC | Age: 63
End: 2023-02-10
Payer: COMMERCIAL

## 2023-02-10 VITALS
DIASTOLIC BLOOD PRESSURE: 84 MMHG | HEART RATE: 64 BPM | SYSTOLIC BLOOD PRESSURE: 110 MMHG | RESPIRATION RATE: 18 BRPM | BODY MASS INDEX: 28.89 KG/M2 | HEIGHT: 61 IN | TEMPERATURE: 97.6 F | WEIGHT: 153 LBS | OXYGEN SATURATION: 93 %

## 2023-02-10 DIAGNOSIS — G47.09 OTHER INSOMNIA: ICD-10-CM

## 2023-02-10 DIAGNOSIS — E78.2 MIXED HYPERLIPIDEMIA: ICD-10-CM

## 2023-02-10 DIAGNOSIS — F41.9 ANXIETY: ICD-10-CM

## 2023-02-10 DIAGNOSIS — N39.0 URINARY TRACT INFECTION WITHOUT HEMATURIA, SITE UNSPECIFIED: ICD-10-CM

## 2023-02-10 DIAGNOSIS — R73.01 IFG (IMPAIRED FASTING GLUCOSE): ICD-10-CM

## 2023-02-10 DIAGNOSIS — R00.2 PALPITATIONS: Primary | ICD-10-CM

## 2023-02-10 LAB
BILIRUBIN, POC: NEGATIVE
BLOOD URINE, POC: NEGATIVE
CLARITY, POC: NORMAL
COLOR, POC: YELLOW
GLUCOSE URINE, POC: NEGATIVE
KETONES, POC: NORMAL
LEUKOCYTE EST, POC: NORMAL
NITRITE, POC: NEGATIVE
PH, POC: 5.5
PROTEIN, POC: NEGATIVE
SPECIFIC GRAVITY, POC: >=1.03
UROBILINOGEN, POC: 0.2

## 2023-02-10 PROCEDURE — 3017F COLORECTAL CA SCREEN DOC REV: CPT | Performed by: FAMILY MEDICINE

## 2023-02-10 PROCEDURE — 99213 OFFICE O/P EST LOW 20 MIN: CPT | Performed by: FAMILY MEDICINE

## 2023-02-10 PROCEDURE — 1036F TOBACCO NON-USER: CPT | Performed by: FAMILY MEDICINE

## 2023-02-10 PROCEDURE — G8484 FLU IMMUNIZE NO ADMIN: HCPCS | Performed by: FAMILY MEDICINE

## 2023-02-10 PROCEDURE — G8427 DOCREV CUR MEDS BY ELIG CLIN: HCPCS | Performed by: FAMILY MEDICINE

## 2023-02-10 PROCEDURE — G8417 CALC BMI ABV UP PARAM F/U: HCPCS | Performed by: FAMILY MEDICINE

## 2023-02-10 PROCEDURE — 81002 URINALYSIS NONAUTO W/O SCOPE: CPT | Performed by: FAMILY MEDICINE

## 2023-02-10 RX ORDER — TRAZODONE HYDROCHLORIDE 50 MG/1
50 TABLET ORAL NIGHTLY
COMMUNITY
Start: 2023-02-09

## 2023-02-10 SDOH — ECONOMIC STABILITY: FOOD INSECURITY: WITHIN THE PAST 12 MONTHS, THE FOOD YOU BOUGHT JUST DIDN'T LAST AND YOU DIDN'T HAVE MONEY TO GET MORE.: NEVER TRUE

## 2023-02-10 SDOH — ECONOMIC STABILITY: INCOME INSECURITY: HOW HARD IS IT FOR YOU TO PAY FOR THE VERY BASICS LIKE FOOD, HOUSING, MEDICAL CARE, AND HEATING?: NOT HARD AT ALL

## 2023-02-10 SDOH — ECONOMIC STABILITY: FOOD INSECURITY: WITHIN THE PAST 12 MONTHS, YOU WORRIED THAT YOUR FOOD WOULD RUN OUT BEFORE YOU GOT MONEY TO BUY MORE.: NEVER TRUE

## 2023-02-10 SDOH — ECONOMIC STABILITY: HOUSING INSECURITY
IN THE LAST 12 MONTHS, WAS THERE A TIME WHEN YOU DID NOT HAVE A STEADY PLACE TO SLEEP OR SLEPT IN A SHELTER (INCLUDING NOW)?: NO

## 2023-02-12 LAB — URINE CULTURE, ROUTINE: NORMAL

## 2023-02-13 PROBLEM — F41.9 ANXIETY: Status: ACTIVE | Noted: 2023-02-13

## 2023-02-13 PROBLEM — G47.09 OTHER INSOMNIA: Status: ACTIVE | Noted: 2023-02-13

## 2023-02-13 PROBLEM — E78.2 MIXED HYPERLIPIDEMIA: Status: ACTIVE | Noted: 2023-02-13

## 2023-02-13 ASSESSMENT — ENCOUNTER SYMPTOMS
BACK PAIN: 0
SORE THROAT: 0
RHINORRHEA: 0
WHEEZING: 0
VOICE CHANGE: 0
DIARRHEA: 0
RESPIRATORY NEGATIVE: 1
SINUS PAIN: 0
TROUBLE SWALLOWING: 0
CONSTIPATION: 0
COUGH: 0
ABDOMINAL DISTENTION: 0
ALLERGIC/IMMUNOLOGIC NEGATIVE: 1
STRIDOR: 0
SINUS PRESSURE: 0
VOMITING: 0
FACIAL SWELLING: 0
CHEST TIGHTNESS: 0
CHOKING: 0
EYE ITCHING: 0
RECTAL PAIN: 0
APNEA: 0
EYE PAIN: 0
NAUSEA: 0
BLOOD IN STOOL: 0
ANAL BLEEDING: 0
COLOR CHANGE: 0
EYE REDNESS: 0
ABDOMINAL PAIN: 0
PHOTOPHOBIA: 0
EYES NEGATIVE: 1
SHORTNESS OF BREATH: 0
EYE DISCHARGE: 0

## 2023-02-13 NOTE — PROGRESS NOTES
KYE Cavazos is a 61 y.o. female. HPI/Chief C/O:  Chief Complaint   Patient presents with    Follow-up     Pt here for a follow up after seeing her cardiologist.      No Known Allergies    This 61year old female presents for cardiology follow up. Pt states she is following a good diet and no caffeine. Pt is following with psych, placed on zoloft 50 mg 1/2 tablet in am and trazodone 50 mg 1/2 tablet in pm. Pt states palpitations decreased. Pt has hyperlipidemia, anxiety, and UTI. Pt denies SI and HI.   ROS:  Review of Systems   Constitutional:  Positive for fatigue. Negative for activity change, appetite change, chills, diaphoresis, fever and unexpected weight change. HENT: Negative. Negative for congestion, dental problem, drooling, ear discharge, ear pain, facial swelling, hearing loss, mouth sores, nosebleeds, postnasal drip, rhinorrhea, sinus pressure, sinus pain, sneezing, sore throat, tinnitus, trouble swallowing and voice change. Eyes: Negative. Negative for photophobia, pain, discharge, redness, itching and visual disturbance. Respiratory: Negative. Negative for apnea, cough, choking, chest tightness, shortness of breath, wheezing and stridor. Cardiovascular:  Positive for palpitations. Negative for chest pain and leg swelling. Gastrointestinal:  Negative for abdominal distention, abdominal pain, anal bleeding, blood in stool, constipation, diarrhea, nausea, rectal pain and vomiting. Endocrine: Negative. Negative for cold intolerance, heat intolerance, polydipsia, polyphagia and polyuria. Genitourinary:  Positive for dysuria, frequency and urgency. Negative for decreased urine volume, difficulty urinating, dyspareunia, enuresis, flank pain, genital sores, hematuria, menstrual problem and pelvic pain. Musculoskeletal: Negative. Negative for arthralgias, back pain, gait problem, joint swelling, myalgias, neck pain and neck stiffness. Skin: Negative.   Negative for color change, pallor, rash and wound.   Allergic/Immunologic: Negative.    Neurological: Negative.  Negative for dizziness, tremors, seizures, syncope, facial asymmetry, speech difficulty, weakness, light-headedness, numbness and headaches.   Hematological: Negative.  Negative for adenopathy. Does not bruise/bleed easily.   Psychiatric/Behavioral:  Negative for agitation, behavioral problems, confusion, decreased concentration, dysphoric mood, hallucinations, self-injury, sleep disturbance and suicidal ideas. The patient is nervous/anxious. The patient is not hyperactive.       Past Medical/Surgical Hx;  Reviewed with patient      Diagnosis Date    Anxiety 2/13/2023    Mixed hyperlipidemia 6/12/2020    Moderate obesity 1/13/2023     Past Surgical History:   Procedure Laterality Date    BREAST LUMPECTOMY Right     COLONOSCOPY N/A 3/14/2022    COLONOSCOPY POLYPECTOMY SNARE/COLD BIOPSY performed by Kyrie Ronquillo MD at [facility]    TONSILLECTOMY      UPPER GASTROINTESTINAL ENDOSCOPY N/A 3/14/2022    EGD BIOPSY performed by Kyrie oRnquillo MD at [facility]       Past Family Hx:  Reviewed with patient  No family history on file.    Social Hx:  Reviewed with patient  Social History     Tobacco Use    Smoking status: Never    Smokeless tobacco: Never   Substance Use Topics    Alcohol use: Yes     Comment: occasionally       OBJECTIVE  /84   Pulse 64   Temp 97.6 °F (36.4 °C) (Temporal)   Resp 18   Ht 5' 1\" (1.549 m)   Wt 153 lb (69.4 kg)   LMP 06/10/2016 (Exact Date)   SpO2 93%   Breastfeeding No   BMI 28.91 kg/m²     Problem List:  Aparna does not have any pertinent problems on file.    PHYS EX:  Physical Exam  Vitals and nursing note reviewed.   Constitutional:       General: She is not in acute distress.     Appearance: Normal appearance. She is well-developed. She is obese. She is not ill-appearing, toxic-appearing or diaphoretic.      Comments: Patient has morbid obesity. Patient instructed  on low calorie, healthy diet. HENT:      Head: Normocephalic and atraumatic. Nose: Nose normal. No congestion or rhinorrhea. Mouth/Throat:      Mouth: Mucous membranes are moist.      Pharynx: Oropharynx is clear. No oropharyngeal exudate or posterior oropharyngeal erythema. Eyes:      General: No scleral icterus. Right eye: No discharge. Left eye: No discharge. Conjunctiva/sclera: Conjunctivae normal.      Pupils: Pupils are equal, round, and reactive to light. Neck:      Thyroid: No thyromegaly. Vascular: No carotid bruit or JVD. Trachea: No tracheal deviation. Cardiovascular:      Rate and Rhythm: Normal rate and regular rhythm. Pulses: Normal pulses. Heart sounds: Normal heart sounds. No murmur heard. No friction rub. No gallop. Pulmonary:      Effort: Pulmonary effort is normal. No respiratory distress. Breath sounds: Normal breath sounds. No stridor. No wheezing, rhonchi or rales. Chest:      Chest wall: No tenderness. Abdominal:      General: Bowel sounds are normal. There is no distension. Palpations: Abdomen is soft. There is no mass. Tenderness: There is no abdominal tenderness. There is no guarding or rebound. Hernia: No hernia is present. Musculoskeletal:         General: No swelling, tenderness, deformity or signs of injury. Normal range of motion. Cervical back: Normal range of motion and neck supple. No rigidity or tenderness. No muscular tenderness. Right lower leg: No edema. Left lower leg: No edema. Lymphadenopathy:      Cervical: No cervical adenopathy. Skin:     General: Skin is warm. Coloration: Skin is not jaundiced or pale. Findings: No bruising, erythema, lesion or rash. Comments:                                                                Neurological:      General: No focal deficit present. Mental Status: She is alert and oriented to person, place, and time. Cranial Nerves: No cranial nerve deficit. Sensory: No sensory deficit. Motor: No weakness or abnormal muscle tone. Coordination: Coordination normal.      Gait: Gait normal.      Deep Tendon Reflexes: Reflexes are normal and symmetric. Reflexes normal.   Psychiatric:         Mood and Affect: Mood normal.         Behavior: Behavior normal.         Thought Content: Thought content normal.         Judgment: Judgment normal.       ASSESSMENT/PLAN  Rosa Quezada was seen today for follow-up. Diagnoses and all orders for this visit:    Palpitations  Cardiology. Mixed hyperlipidemia  -     LIPID PANEL; Future  Low chol. Diet. Urinary tract infection without hematuria, site unspecified  -     POCT Urinalysis no Micro  -     Culture, Urine; Future  Patient instructed on treatment and prevention UTI. IFG (impaired fasting glucose)  -     Hemoglobin A1C; Future    Anxiety  Psych, zoloft, trazodone. Other insomnia  Trazodone. Pt instructed if any worse go ED ASAP. Outpatient Encounter Medications as of 2/10/2023   Medication Sig Dispense Refill    sertraline (ZOLOFT) 50 MG tablet Take 50 mg by mouth daily 1/2 tablet daily      traZODone (DESYREL) 50 MG tablet Take 50 mg by mouth nightly 1/2 tablet nightly      omeprazole (PRILOSEC) 40 MG delayed release capsule Take 40 mg by mouth daily      FIBER COMPLETE PO Take by mouth daily       No facility-administered encounter medications on file as of 2/10/2023. Return in about 3 months (around 5/10/2023).         Reviewed recent labs related to Celia current problems      Discussed importance of regular Health Maintenance follow up  Health Maintenance   Topic    Hepatitis C screen     DTaP/Tdap/Td vaccine (1 - Tdap)    Cervical cancer screen     Shingles vaccine (1 of 2)    COVID-19 Vaccine (3 - Booster for [employer] series)    Flu vaccine (1)    Breast cancer screen     Depression Screen     Diabetes screen     Colorectal Cancer Screen Lipids     HIV screen     Hepatitis A vaccine     Hib vaccine     Meningococcal (ACWY) vaccine     Pneumococcal 0-64 years Vaccine

## 2023-02-24 ENCOUNTER — OFFICE VISIT (OUTPATIENT)
Dept: FAMILY MEDICINE CLINIC | Age: 63
End: 2023-02-24
Payer: COMMERCIAL

## 2023-02-24 VITALS
DIASTOLIC BLOOD PRESSURE: 80 MMHG | TEMPERATURE: 98.1 F | RESPIRATION RATE: 17 BRPM | HEIGHT: 61 IN | BODY MASS INDEX: 28.32 KG/M2 | WEIGHT: 150 LBS | HEART RATE: 73 BPM | OXYGEN SATURATION: 99 % | SYSTOLIC BLOOD PRESSURE: 122 MMHG

## 2023-02-24 DIAGNOSIS — E78.2 MIXED HYPERLIPIDEMIA: ICD-10-CM

## 2023-02-24 DIAGNOSIS — R35.0 URINARY FREQUENCY: Primary | ICD-10-CM

## 2023-02-24 LAB
BILIRUBIN, POC: NORMAL
BLOOD URINE, POC: NORMAL
CLARITY, POC: CLEAR
COLOR, POC: YELLOW
GLUCOSE URINE, POC: NORMAL
KETONES, POC: NORMAL
LEUKOCYTE EST, POC: NORMAL
NITRITE, POC: NORMAL
PH, POC: 5.5
PROTEIN, POC: NORMAL
SPECIFIC GRAVITY, POC: 1.02
UROBILINOGEN, POC: 0.2

## 2023-02-24 PROCEDURE — 81002 URINALYSIS NONAUTO W/O SCOPE: CPT | Performed by: FAMILY MEDICINE

## 2023-02-24 PROCEDURE — G8417 CALC BMI ABV UP PARAM F/U: HCPCS | Performed by: FAMILY MEDICINE

## 2023-02-24 PROCEDURE — G8484 FLU IMMUNIZE NO ADMIN: HCPCS | Performed by: FAMILY MEDICINE

## 2023-02-24 PROCEDURE — G8427 DOCREV CUR MEDS BY ELIG CLIN: HCPCS | Performed by: FAMILY MEDICINE

## 2023-02-24 PROCEDURE — 1036F TOBACCO NON-USER: CPT | Performed by: FAMILY MEDICINE

## 2023-02-24 PROCEDURE — 99213 OFFICE O/P EST LOW 20 MIN: CPT | Performed by: FAMILY MEDICINE

## 2023-02-24 PROCEDURE — 3017F COLORECTAL CA SCREEN DOC REV: CPT | Performed by: FAMILY MEDICINE

## 2023-02-27 ENCOUNTER — TELEPHONE (OUTPATIENT)
Dept: FAMILY MEDICINE CLINIC | Age: 63
End: 2023-02-27

## 2023-02-27 DIAGNOSIS — N94.9 VAGINAL BURNING: Primary | ICD-10-CM

## 2023-02-27 PROBLEM — R35.0 URINARY FREQUENCY: Status: ACTIVE | Noted: 2023-02-27

## 2023-02-27 ASSESSMENT — ENCOUNTER SYMPTOMS
COUGH: 0
SORE THROAT: 0
TROUBLE SWALLOWING: 0
WHEEZING: 0
COLOR CHANGE: 0
SINUS PRESSURE: 0
RHINORRHEA: 0
SHORTNESS OF BREATH: 0
NAUSEA: 0
RECTAL PAIN: 0
PHOTOPHOBIA: 0
ANAL BLEEDING: 0
SINUS PAIN: 0
FACIAL SWELLING: 0
CONSTIPATION: 0
APNEA: 0
EYE PAIN: 0
EYE ITCHING: 0
EYES NEGATIVE: 1
BACK PAIN: 0
DIARRHEA: 0
CHEST TIGHTNESS: 0
ABDOMINAL DISTENTION: 0
CHOKING: 0
VOICE CHANGE: 0
STRIDOR: 0
RESPIRATORY NEGATIVE: 1
BLOOD IN STOOL: 0
EYE DISCHARGE: 0
EYE REDNESS: 0
ABDOMINAL PAIN: 0
ALLERGIC/IMMUNOLOGIC NEGATIVE: 1
VOMITING: 0

## 2023-02-27 NOTE — PROGRESS NOTES
SUBJECTIVE  Liss Chan is a 61 y.o. female. HPI/Chief C/O:  Chief Complaint   Patient presents with    Urinary Tract Infection     Pt presents to the office for a reoccurring UTI. Admits to pressure, burning and frequent urination. No Known Allergies    This 61year old female presents for recheck on UTI. Pt denies dysuria. Urine culture 1/12/1022 was negative. Pt c/o urinary frequency. Pt instructed to DC swimming for 1 month. Pt has hyperlipidemia    ROS:  Review of Systems   Constitutional:  Positive for fatigue. Negative for activity change, appetite change, chills, diaphoresis, fever and unexpected weight change. HENT: Negative. Negative for congestion, dental problem, drooling, ear discharge, ear pain, facial swelling, hearing loss, mouth sores, nosebleeds, postnasal drip, rhinorrhea, sinus pressure, sinus pain, sneezing, sore throat, tinnitus, trouble swallowing and voice change. Eyes: Negative. Negative for photophobia, pain, discharge, redness, itching and visual disturbance. Respiratory: Negative. Negative for apnea, cough, choking, chest tightness, shortness of breath, wheezing and stridor. Cardiovascular:  Negative for chest pain, palpitations and leg swelling. Gastrointestinal:  Negative for abdominal distention, abdominal pain, anal bleeding, blood in stool, constipation, diarrhea, nausea, rectal pain and vomiting. Endocrine: Negative. Negative for cold intolerance, heat intolerance, polydipsia, polyphagia and polyuria. Genitourinary:  Positive for frequency and urgency. Negative for decreased urine volume, difficulty urinating, dyspareunia, dysuria, enuresis, flank pain, genital sores, hematuria, menstrual problem and pelvic pain. Musculoskeletal: Negative. Negative for arthralgias, back pain, gait problem, joint swelling, myalgias, neck pain and neck stiffness. Skin: Negative. Negative for color change, pallor, rash and wound. Allergic/Immunologic: Negative. Neurological: Negative. Negative for dizziness, tremors, seizures, syncope, facial asymmetry, speech difficulty, weakness, light-headedness, numbness and headaches. Hematological: Negative. Negative for adenopathy. Does not bruise/bleed easily. Psychiatric/Behavioral:  Negative for agitation, behavioral problems, confusion, decreased concentration, dysphoric mood, hallucinations, self-injury, sleep disturbance and suicidal ideas. The patient is nervous/anxious. The patient is not hyperactive. Past Medical/Surgical Hx;  Reviewed with patient      Diagnosis Date    Anxiety 2/13/2023    Mixed hyperlipidemia 6/12/2020    Moderate obesity 1/13/2023     Past Surgical History:   Procedure Laterality Date    BREAST LUMPECTOMY Right     COLONOSCOPY N/A 3/14/2022    COLONOSCOPY POLYPECTOMY SNARE/COLD BIOPSY performed by Moe Grider MD at [address] [place] [address] N/A 3/14/2022    EGD BIOPSY performed by Moe Grider MD at [facility] ENDOSCOPY       Past Family Hx:  Reviewed with patient  No family history on file. Social Hx:  Reviewed with patient  Social History     Tobacco Use    Smoking status: Never    Smokeless tobacco: Never   Substance Use Topics    Alcohol use: Yes     Comment: occasionally       OBJECTIVE  /80   Pulse 73   Temp 98.1 °F (36.7 °C) (Temporal)   Resp 17   Ht 5' 1\" (1.549 m)   Wt 150 lb (68 kg)   LMP 06/10/2016 (Exact Date)   SpO2 99%   Breastfeeding No   BMI 28.34 kg/m²     Problem List:  Ingrid George does not have any pertinent problems on file. PHYS EX:  Physical Exam  Vitals and nursing note reviewed. Constitutional:       General: She is not in acute distress. Appearance: Normal appearance. She is well-developed. She is obese. She is not ill-appearing, toxic-appearing or diaphoretic. Comments: Patient has morbid obesity. Patient instructed on low calorie, healthy diet.       HENT:      Head: Normocephalic and atraumatic. Nose: Nose normal. No congestion or rhinorrhea. Mouth/Throat:      Mouth: Mucous membranes are moist.      Pharynx: Oropharynx is clear. No oropharyngeal exudate or posterior oropharyngeal erythema. Eyes:      General: No scleral icterus. Right eye: No discharge. Left eye: No discharge. Conjunctiva/sclera: Conjunctivae normal.      Pupils: Pupils are equal, round, and reactive to light. Neck:      Thyroid: No thyromegaly. Vascular: No carotid bruit or JVD. Trachea: No tracheal deviation. Cardiovascular:      Rate and Rhythm: Normal rate and regular rhythm. Pulses: Normal pulses. Heart sounds: Normal heart sounds. No murmur heard. No friction rub. No gallop. Pulmonary:      Effort: Pulmonary effort is normal. No respiratory distress. Breath sounds: Normal breath sounds. No stridor. No wheezing, rhonchi or rales. Chest:      Chest wall: No tenderness. Abdominal:      General: Bowel sounds are normal. There is no distension. Palpations: Abdomen is soft. There is no mass. Tenderness: There is no abdominal tenderness. There is no guarding or rebound. Hernia: No hernia is present. Musculoskeletal:         General: No swelling, tenderness, deformity or signs of injury. Normal range of motion. Cervical back: Normal range of motion and neck supple. No rigidity or tenderness. No muscular tenderness. Right lower leg: No edema. Left lower leg: No edema. Lymphadenopathy:      Cervical: No cervical adenopathy. Skin:     General: Skin is warm. Coloration: Skin is not jaundiced or pale. Findings: No bruising, erythema, lesion or rash. Comments:                                                                Neurological:      General: No focal deficit present. Mental Status: She is alert and oriented to person, place, and time. Cranial Nerves: No cranial nerve deficit.       Sensory: No sensory deficit. Motor: No weakness or abnormal muscle tone. Coordination: Coordination normal.      Gait: Gait normal.      Deep Tendon Reflexes: Reflexes are normal and symmetric. Reflexes normal.   Psychiatric:         Mood and Affect: Mood normal.         Behavior: Behavior normal.         Thought Content: Thought content normal.         Judgment: Judgment normal.       ASSESSMENT/PLAN  Jazlyn Heath was seen today for urinary tract infection. Diagnoses and all orders for this visit:    Urinary frequency  -     Cancel: POCT Urinalysis no Micro  -     POCT Urinalysis no Micro  Patient instructed on treatment and prevention UTI. Mixed hyperlipidemia  Low chol. Diet. Pt instructed if any worse go ED ASAP. Outpatient Encounter Medications as of 2/24/2023   Medication Sig Dispense Refill    sertraline (ZOLOFT) 50 MG tablet Take 50 mg by mouth daily 1/2 tablet daily      traZODone (DESYREL) 50 MG tablet Take 50 mg by mouth nightly 1/2 tablet nightly      omeprazole (PRILOSEC) 40 MG delayed release capsule Take 40 mg by mouth daily      FIBER COMPLETE PO Take by mouth daily       No facility-administered encounter medications on file as of 2/24/2023. Return in about 4 weeks (around 3/24/2023).         Reviewed recent labs related to Aparna's current problems      Discussed importance of regular Health Maintenance follow up  Health Maintenance   Topic    Hepatitis C screen     DTaP/Tdap/Td vaccine (1 - Tdap)    Cervical cancer screen     Shingles vaccine (1 of 2)    COVID-19 Vaccine (3 - Booster for Earla Bjornstad series)    Flu vaccine (1)    Breast cancer screen     Depression Screen     Diabetes screen     Colorectal Cancer Screen     Lipids     HIV screen     Hepatitis A vaccine     Hib vaccine     Meningococcal (ACWY) vaccine     Pneumococcal 0-64 years Vaccine

## 2023-02-27 NOTE — TELEPHONE ENCOUNTER
Patient informed of negative UA . Patient states she is still having vaginal burning  and wants to know the next step or if she should have her urine retested. Please advise?   Electronically signed by Adama Coronado MA on 2/27/23 at 4:01 PM EST

## 2023-02-27 NOTE — TELEPHONE ENCOUNTER
Pt called stating that after her 2/24 office visit she dropped off a urine sample for UTI. Pt asking if she need another antibiotic. Please advise.     Electronically signed by Edie Uribe MA on 2/27/23 at 2:27 PM EST

## 2023-04-06 ENCOUNTER — HOSPITAL ENCOUNTER (EMERGENCY)
Age: 63
Discharge: HOME | End: 2023-04-06
Attending: EMERGENCY MEDICINE
Payer: COMMERCIAL

## 2023-04-06 ENCOUNTER — APPOINTMENT (OUTPATIENT)
Dept: CT IMAGING | Age: 63
End: 2023-04-06
Payer: COMMERCIAL

## 2023-04-06 ENCOUNTER — APPOINTMENT (OUTPATIENT)
Dept: GENERAL RADIOLOGY | Age: 63
End: 2023-04-06
Payer: COMMERCIAL

## 2023-04-06 VITALS
HEART RATE: 89 BPM | WEIGHT: 133 LBS | TEMPERATURE: 97.8 F | BODY MASS INDEX: 25.11 KG/M2 | HEIGHT: 61 IN | DIASTOLIC BLOOD PRESSURE: 62 MMHG | SYSTOLIC BLOOD PRESSURE: 112 MMHG | OXYGEN SATURATION: 99 % | RESPIRATION RATE: 16 BRPM

## 2023-04-06 DIAGNOSIS — S09.90XA CLOSED HEAD INJURY, INITIAL ENCOUNTER: Primary | ICD-10-CM

## 2023-04-06 DIAGNOSIS — S50.11XA CONTUSION OF RIGHT FOREARM, INITIAL ENCOUNTER: ICD-10-CM

## 2023-04-06 DIAGNOSIS — S46.912A STRAIN OF LEFT SHOULDER, INITIAL ENCOUNTER: ICD-10-CM

## 2023-04-06 DIAGNOSIS — S80.02XA CONTUSION OF LEFT KNEE, INITIAL ENCOUNTER: ICD-10-CM

## 2023-04-06 DIAGNOSIS — S76.012A HIP STRAIN, LEFT, INITIAL ENCOUNTER: ICD-10-CM

## 2023-04-06 PROCEDURE — 70450 CT HEAD/BRAIN W/O DYE: CPT

## 2023-04-06 PROCEDURE — 73090 X-RAY EXAM OF FOREARM: CPT

## 2023-04-06 PROCEDURE — 73502 X-RAY EXAM HIP UNI 2-3 VIEWS: CPT

## 2023-04-06 PROCEDURE — 73030 X-RAY EXAM OF SHOULDER: CPT

## 2023-04-06 PROCEDURE — 73560 X-RAY EXAM OF KNEE 1 OR 2: CPT

## 2023-04-06 PROCEDURE — 71046 X-RAY EXAM CHEST 2 VIEWS: CPT

## 2023-04-06 PROCEDURE — 6370000000 HC RX 637 (ALT 250 FOR IP): Performed by: EMERGENCY MEDICINE

## 2023-04-06 RX ORDER — IBUPROFEN 800 MG/1
800 TABLET ORAL EVERY 8 HOURS PRN
Qty: 12 TABLET | Refills: 0 | Status: SHIPPED | OUTPATIENT
Start: 2023-04-06

## 2023-04-06 RX ORDER — ORPHENADRINE CITRATE 100 MG/1
100 TABLET, EXTENDED RELEASE ORAL 2 TIMES DAILY PRN
Qty: 20 TABLET | Refills: 0 | Status: SHIPPED | OUTPATIENT
Start: 2023-04-06 | End: 2023-04-16

## 2023-04-06 RX ORDER — ACETAMINOPHEN 500 MG
1000 TABLET ORAL ONCE
Status: COMPLETED | OUTPATIENT
Start: 2023-04-06 | End: 2023-04-06

## 2023-04-06 RX ADMIN — ACETAMINOPHEN 1000 MG: 500 TABLET ORAL at 16:52

## 2023-04-06 ASSESSMENT — PAIN SCALES - GENERAL
PAINLEVEL_OUTOF10: 5
PAINLEVEL_OUTOF10: 5

## 2023-04-06 ASSESSMENT — PAIN DESCRIPTION - DESCRIPTORS: DESCRIPTORS: TENDER

## 2023-04-06 ASSESSMENT — PAIN DESCRIPTION - FREQUENCY: FREQUENCY: CONTINUOUS

## 2023-04-06 ASSESSMENT — PAIN DESCRIPTION - LOCATION: LOCATION: ARM;HIP;KNEE

## 2023-04-06 ASSESSMENT — PAIN DESCRIPTION - PAIN TYPE: TYPE: ACUTE PAIN

## 2023-04-06 ASSESSMENT — PAIN DESCRIPTION - ONSET: ONSET: SUDDEN

## 2023-04-06 ASSESSMENT — PAIN - FUNCTIONAL ASSESSMENT: PAIN_FUNCTIONAL_ASSESSMENT: 0-10

## 2023-04-06 ASSESSMENT — PAIN DESCRIPTION - ORIENTATION: ORIENTATION: LEFT

## 2023-04-06 NOTE — ED PROVIDER NOTES
Normal rate, normal rhythm, no murmurs, no gallops, no rubs. Radial and DP pulses 2+ bilaterally. GI:  Soft, nondistended, normal bowel sounds, nontender, no organomegaly, no mass, no rebound, no guarding   :  No costovertebral angle tenderness   Musculoskeletal:  No edema, no tenderness, no deformities. Back-no midline or paraspinal cervical, thoracic or lumbar tenderness. No step-offs. Chest able. Pelvis stable. Moves all 4 extremities without difficulty or pain though her left knee is mildly tender to palpation with a contusion just above her patella and a right mid distal forearm contusion. Her left shoulder is mildly tender to palpation as well as her left hip but there is no limitation in any range of motion of any joint or extremity. Integument:  Well hydrated, no rash. Adequate perfusion. Neurologic:  Alert & oriented x 3, CN 2-12 normal,  no focal deficits noted. DTRs 2+ bilateral patellar. Normal gait. Psychiatric:  Speech and behavior appropriate             DIAGNOSTIC RESULTS   LABS:  No results found for this visit on 04/06/23. As interpreted by me, the above displayed labs are abnormal. All other labs obtained during this visit were within normal range or not returned as of this dictation. RADIOLOGY:   Non-plain film images such as CT, Ultrasound and MRI are read by the radiologist. Plain radiographic images are visualized and preliminarily interpreted by the ED Provider with the below findings:    Left knee: no acute process, mild joint effsuion? Left hip: no acute process  Pelvis: no acute process  Right forearm: no acute process  Left shoulder: no acute process  CXR: no acute process    Interpretation per the Radiologist below, if available at the time of this note:    CT HEAD WO CONTRAST   Final Result   No acute intracranial abnormality.       RECOMMENDATIONS:   Unavailable         XR HIP 2-3 VW W PELVIS LEFT   Final Result   No acute abnormality of the pelvis and left

## 2023-04-21 ENCOUNTER — HOSPITAL ENCOUNTER (EMERGENCY)
Age: 63
Discharge: HOME | End: 2023-04-21
Attending: STUDENT IN AN ORGANIZED HEALTH CARE EDUCATION/TRAINING PROGRAM
Payer: COMMERCIAL

## 2023-04-21 ENCOUNTER — APPOINTMENT (OUTPATIENT)
Dept: GENERAL RADIOLOGY | Age: 63
End: 2023-04-21
Payer: COMMERCIAL

## 2023-04-21 ENCOUNTER — TELEPHONE (OUTPATIENT)
Dept: FAMILY MEDICINE CLINIC | Age: 63
End: 2023-04-21

## 2023-04-21 VITALS
HEIGHT: 61 IN | WEIGHT: 139 LBS | SYSTOLIC BLOOD PRESSURE: 114 MMHG | TEMPERATURE: 97.8 F | HEART RATE: 70 BPM | BODY MASS INDEX: 26.24 KG/M2 | RESPIRATION RATE: 16 BRPM | OXYGEN SATURATION: 98 % | DIASTOLIC BLOOD PRESSURE: 72 MMHG

## 2023-04-21 DIAGNOSIS — S89.92XA INJURY OF LEFT PATELLA, INITIAL ENCOUNTER: ICD-10-CM

## 2023-04-21 DIAGNOSIS — M25.462 KNEE EFFUSION, LEFT: Primary | ICD-10-CM

## 2023-04-21 PROCEDURE — 73560 X-RAY EXAM OF KNEE 1 OR 2: CPT

## 2023-04-21 PROCEDURE — 99283 EMERGENCY DEPT VISIT LOW MDM: CPT

## 2023-04-21 ASSESSMENT — ENCOUNTER SYMPTOMS
VOMITING: 0
SHORTNESS OF BREATH: 0
COLOR CHANGE: 0
NAUSEA: 0
DIARRHEA: 0
ABDOMINAL PAIN: 0
COUGH: 0
BACK PAIN: 0

## 2023-04-21 NOTE — ED PROVIDER NOTES
HPI   This is a 30-year-old female who presents emergency department for evaluation of left knee pain. Patient reportedly had a fall on 4/6, fell forward landed on her left knee, left shoulder. Patient was actually seen here had a work-up and everything was grossly negative. She did have mild left knee effusion. She is here today complaining of persistent left knee worsening swelling. She actually says that her pain is well controlled. She denies any numbness or weakness. She is not on any blood thinners. She notes pain with flexion of the knee. No numbness or weakness. No fevers or chills. She is not a diabetic. Review of Systems   Constitutional:  Negative for chills and fever. HENT:  Negative for congestion. Respiratory:  Negative for cough and shortness of breath. Cardiovascular:  Negative for chest pain. Gastrointestinal:  Negative for abdominal pain, diarrhea, nausea and vomiting. Genitourinary:  Negative for difficulty urinating, dysuria and hematuria. Musculoskeletal:  Negative for back pain. Left knee pain and swelling   Skin:  Negative for color change. Physical Exam  Vitals and nursing note reviewed. Constitutional:       Appearance: Normal appearance. HENT:      Head: Normocephalic and atraumatic. Nose: Nose normal. No congestion. Mouth/Throat:      Mouth: Mucous membranes are moist.      Pharynx: Oropharynx is clear. Eyes:      Conjunctiva/sclera: Conjunctivae normal.      Pupils: Pupils are equal, round, and reactive to light. Cardiovascular:      Rate and Rhythm: Normal rate and regular rhythm. Pulses: Normal pulses. Heart sounds: Normal heart sounds. Pulmonary:      Effort: Pulmonary effort is normal. No respiratory distress. Breath sounds: Normal breath sounds. Abdominal:      General: Bowel sounds are normal. There is no distension. Tenderness: There is no abdominal tenderness.    Musculoskeletal:         General:

## 2023-04-21 NOTE — TELEPHONE ENCOUNTER
Patient called stating that she was seen a couple of times in the ER for a worker's comp claim and she is needing a C-9 to go to an orthopedic surgeon. I checked with Lilibeth Bell and verified that we do not handle any worker's comp cases in any of the primary care offices. I was advised to tell the patient to contact her employer for who they work with for their comp claims.  I called the patient back and informed her

## 2023-04-24 ENCOUNTER — TELEPHONE (OUTPATIENT)
Dept: ORTHOPEDIC SURGERY | Age: 63
End: 2023-04-24

## 2023-04-24 NOTE — TELEPHONE ENCOUNTER
Contacted patient and was she given the [facility] address and telephone number to contact. Instructed patient to contact their office to schedule appointment to be seen for f/u with orthopedics. She was educated that that office will assist with obtaining the C9 for visit. Patient verbalized understanding of plan.

## 2023-04-24 NOTE — TELEPHONE ENCOUNTER
Vm from pt stating seen at [facility]/re: a claim #     Call back to pt, she stated she was told by Dr. Laxmi Rogel office that she needs to contact the [facility] for a C9. Per [facility] website-  [facility] number [phone]  Date of injury 04/06/2023  Iesha Strong DO ED doctor  Phone number  [phone]    Advised to contact Dr. Laxmi Rogel office     Routing to [facility] staff  as Dr. Laxmi Rogel office was on call. Need to review referral or direct pt to go to Occupational health in [address] [place] [address]. Home

## 2023-05-22 ENCOUNTER — OFFICE VISIT (OUTPATIENT)
Dept: FAMILY MEDICINE CLINIC | Age: 63
End: 2023-05-22
Payer: COMMERCIAL

## 2023-05-22 VITALS
HEART RATE: 66 BPM | DIASTOLIC BLOOD PRESSURE: 68 MMHG | SYSTOLIC BLOOD PRESSURE: 98 MMHG | BODY MASS INDEX: 26.17 KG/M2 | RESPIRATION RATE: 20 BRPM | WEIGHT: 138.6 LBS | HEIGHT: 61 IN | TEMPERATURE: 97.6 F | OXYGEN SATURATION: 98 %

## 2023-05-22 DIAGNOSIS — E78.2 MIXED HYPERLIPIDEMIA: ICD-10-CM

## 2023-05-22 DIAGNOSIS — R73.03 PREDIABETES: ICD-10-CM

## 2023-05-22 DIAGNOSIS — Z00.00 PHYSICAL EXAM: Primary | ICD-10-CM

## 2023-05-22 DIAGNOSIS — S82.002S CLOSED NONDISPLACED FRACTURE OF LEFT PATELLA, UNSPECIFIED FRACTURE MORPHOLOGY, SEQUELA: ICD-10-CM

## 2023-05-22 PROCEDURE — G8417 CALC BMI ABV UP PARAM F/U: HCPCS | Performed by: FAMILY MEDICINE

## 2023-05-22 PROCEDURE — G8427 DOCREV CUR MEDS BY ELIG CLIN: HCPCS | Performed by: FAMILY MEDICINE

## 2023-05-22 PROCEDURE — 99214 OFFICE O/P EST MOD 30 MIN: CPT | Performed by: FAMILY MEDICINE

## 2023-05-22 PROCEDURE — 1036F TOBACCO NON-USER: CPT | Performed by: FAMILY MEDICINE

## 2023-05-22 PROCEDURE — 3017F COLORECTAL CA SCREEN DOC REV: CPT | Performed by: FAMILY MEDICINE

## 2023-05-22 RX ORDER — ESCITALOPRAM OXALATE 10 MG/1
TABLET ORAL
COMMUNITY
Start: 2023-04-14

## 2023-05-24 PROBLEM — Z00.00 PHYSICAL EXAM: Status: ACTIVE | Noted: 2023-05-24

## 2023-05-24 PROBLEM — R73.03 PREDIABETES: Status: ACTIVE | Noted: 2023-05-24

## 2023-05-24 PROBLEM — S82.002A CLOSED NONDISPLACED FRACTURE OF LEFT PATELLA: Status: ACTIVE | Noted: 2023-05-24

## 2023-05-24 ASSESSMENT — ENCOUNTER SYMPTOMS
RECTAL PAIN: 0
CHOKING: 0
NAUSEA: 0
COUGH: 0
VOICE CHANGE: 0
APNEA: 0
WHEEZING: 0
DIARRHEA: 0
RHINORRHEA: 0
CONSTIPATION: 0
TROUBLE SWALLOWING: 0
SINUS PAIN: 0
FACIAL SWELLING: 0
PHOTOPHOBIA: 0
COLOR CHANGE: 0
ALLERGIC/IMMUNOLOGIC NEGATIVE: 1
ABDOMINAL PAIN: 0
EYE DISCHARGE: 0
EYE ITCHING: 0
EYES NEGATIVE: 1
BACK PAIN: 0
CHEST TIGHTNESS: 0
SHORTNESS OF BREATH: 0
BLOOD IN STOOL: 0
ANAL BLEEDING: 0
ABDOMINAL DISTENTION: 0
EYE PAIN: 0
EYE REDNESS: 0
SORE THROAT: 0
RESPIRATORY NEGATIVE: 1
STRIDOR: 0
SINUS PRESSURE: 0
VOMITING: 0

## 2023-05-24 NOTE — PROGRESS NOTES
SUBJECTIVE  Joelle Alfred is a 61 y.o. female. HPI/Chief C/O:  Chief Complaint   Patient presents with    3 Month Follow-Up     No Known Allergies    This 61year old female presents for physical exam. Pt has hyperlipidemia, anxiety, insomnia, and closed nondisplaced fracture of left patella. Pt follows with ortho. Pt states she feels well. UTI resolved. ROS:  Review of Systems   Constitutional:  Positive for fatigue. Negative for activity change, appetite change, chills, diaphoresis, fever and unexpected weight change. HENT: Negative. Negative for congestion, dental problem, drooling, ear discharge, ear pain, facial swelling, hearing loss, mouth sores, nosebleeds, postnasal drip, rhinorrhea, sinus pressure, sinus pain, sneezing, sore throat, tinnitus, trouble swallowing and voice change. Eyes: Negative. Negative for photophobia, pain, discharge, redness, itching and visual disturbance. Respiratory: Negative. Negative for apnea, cough, choking, chest tightness, shortness of breath, wheezing and stridor. Cardiovascular:  Negative for chest pain, palpitations and leg swelling. Gastrointestinal:  Negative for abdominal distention, abdominal pain, anal bleeding, blood in stool, constipation, diarrhea, nausea, rectal pain and vomiting. Endocrine: Negative. Negative for cold intolerance, heat intolerance, polydipsia, polyphagia and polyuria. Genitourinary:  Negative for decreased urine volume, difficulty urinating, dyspareunia, dysuria, enuresis, flank pain, frequency, genital sores, hematuria, menstrual problem, pelvic pain and urgency. Musculoskeletal: Negative. Negative for arthralgias, back pain, gait problem, joint swelling, myalgias, neck pain and neck stiffness. Skin: Negative. Negative for color change, pallor, rash and wound. Allergic/Immunologic: Negative. Neurological: Negative.   Negative for dizziness, tremors, seizures, syncope, facial asymmetry, speech difficulty,

## 2023-06-23 PROBLEM — Z00.00 PHYSICAL EXAM: Status: RESOLVED | Noted: 2023-05-24 | Resolved: 2023-06-23

## 2023-07-13 ENCOUNTER — OFFICE VISIT (OUTPATIENT)
Dept: ENT CLINIC | Age: 63
End: 2023-07-13

## 2023-07-13 ENCOUNTER — PROCEDURE VISIT (OUTPATIENT)
Dept: AUDIOLOGY | Age: 63
End: 2023-07-13
Payer: COMMERCIAL

## 2023-07-13 VITALS
HEIGHT: 61 IN | OXYGEN SATURATION: 97 % | SYSTOLIC BLOOD PRESSURE: 100 MMHG | BODY MASS INDEX: 26.06 KG/M2 | HEART RATE: 67 BPM | DIASTOLIC BLOOD PRESSURE: 65 MMHG | WEIGHT: 138 LBS

## 2023-07-13 DIAGNOSIS — H90.3 SENSORINEURAL HEARING LOSS (SNHL) OF BOTH EARS: ICD-10-CM

## 2023-07-13 DIAGNOSIS — H93.13 TINNITUS OF BOTH EARS: Primary | ICD-10-CM

## 2023-07-13 PROCEDURE — 92557 COMPREHENSIVE HEARING TEST: CPT | Performed by: AUDIOLOGIST

## 2023-07-13 PROCEDURE — 92567 TYMPANOMETRY: CPT | Performed by: AUDIOLOGIST

## 2023-07-13 ASSESSMENT — ENCOUNTER SYMPTOMS
VOMITING: 0
SORE THROAT: 0
COUGH: 0
ALLERGIC/IMMUNOLOGIC NEGATIVE: 1
DIARRHEA: 0
EYE DISCHARGE: 0
SHORTNESS OF BREATH: 0
EYE PAIN: 0
BACK PAIN: 0
SINUS PRESSURE: 0
RHINORRHEA: 0

## 2023-07-13 NOTE — PROGRESS NOTES
Subjective:     Patient ID:  Salvatore Richardson is a 61 y.o. female. HPI:    Hearing Loss  Patient presents today with complaints of hearing loss. Concern regarding hearing has been present for 3.5 months. She has not failed a prior hearing test.The patient reports difficulty hearing. Tinnitus  Patient presents with tinnitus. Onset of symptoms was abrupt 3.5 months ago ago with gradually worsening course since that time. Patient describes the tinnitus as constant located in the bilateral ear. The quality is described as high pitch that sounds like tone. The pattern is nonpulsatile with an intensity that is medium. Patient describes her level of annoyance as moderately annoying, always aware. Associated symptoms include hearing loss Family history is positive for tinnitus in the patient's father and 1 sister(s)Patient has had no prior evaluation, treatment or surgery for tinnitus  Previous treatments include none. Patient does not have hearing aids at this time. History of Head trauma: yes   Description: \Patient states that she had an accident at work. She states she fell at work striking her head on a metal filing cabinet. After striking head she lost consciousness CT of head was negative per her report.     History of surgery to the head/neck: no   Description:none  History of cerumen impaction: no  History of noise exposure: no   Type: none  Spinning: no  Hearing loss: yes    Fluctuating: no  Aural pressure: no  Tinnitus:no  Otalgia:no    Past Medical History:   Diagnosis Date    Anxiety 2/13/2023    Mixed hyperlipidemia 6/12/2020    Moderate obesity 1/13/2023     Past Surgical History:   Procedure Laterality Date    BREAST LUMPECTOMY Right     COLONOSCOPY N/A 3/14/2022    COLONOSCOPY POLYPECTOMY SNARE/COLD BIOPSY performed by Juan Ramno Roberts MD at [address] [facility] N/A 3/14/2022    EGD BIOPSY performed by Juan Ramon Robrets MD at [address] [facility]

## 2023-07-13 NOTE — PROGRESS NOTES
This patient was referred for audiometric/tympanometric testing by SANDEEP Carter due to tinnitus bilaterally. Audiometry using pure tone air and bone conduction revealed hearing sensitivity within normal limits through 1000 Hz sloping to moderate sensorineural hearing loss bilaterally. Reliability was good. Speech reception thresholds were in good agreement with the pure tone averages, bilaterally. Speech discrimination scores were excellent, bilaterally. Tympanometry revealed normal middle ear peak pressure and compliance, bilaterally. The results were reviewed with the patient and CNP. Recommendations for follow up will be made pending physician consult.     Leonor Gibson/CCC-A  [place] Lic # [id number]

## 2023-09-05 ENCOUNTER — TELEPHONE (OUTPATIENT)
Dept: FAMILY MEDICINE CLINIC | Age: 63
End: 2023-09-05

## 2023-09-05 DIAGNOSIS — R23.9 SKIN CHANGE: Primary | ICD-10-CM

## 2023-09-05 NOTE — TELEPHONE ENCOUNTER
Dr. Eduardo Terry office called and would like us to put in a referral for their office. Referral placed and sent.     Electronically signed by Ct Pang on 9/5/23 at 4:03 PM EDT

## 2023-11-03 LAB — MAMMOGRAPHY, EXTERNAL: NORMAL

## 2023-11-29 ENCOUNTER — OFFICE VISIT (OUTPATIENT)
Dept: FAMILY MEDICINE CLINIC | Age: 63
End: 2023-11-29

## 2023-11-29 VITALS
DIASTOLIC BLOOD PRESSURE: 72 MMHG | OXYGEN SATURATION: 98 % | HEIGHT: 61 IN | BODY MASS INDEX: 27.94 KG/M2 | SYSTOLIC BLOOD PRESSURE: 116 MMHG | TEMPERATURE: 97.5 F | RESPIRATION RATE: 18 BRPM | HEART RATE: 70 BPM | WEIGHT: 148 LBS

## 2023-11-29 DIAGNOSIS — G47.00 INSOMNIA, UNSPECIFIED TYPE: ICD-10-CM

## 2023-11-29 DIAGNOSIS — R00.2 PALPITATIONS: ICD-10-CM

## 2023-11-29 DIAGNOSIS — Z00.00 PHYSICAL EXAM: Primary | ICD-10-CM

## 2023-12-01 PROBLEM — G47.00 INSOMNIA: Status: ACTIVE | Noted: 2023-12-01

## 2023-12-01 PROBLEM — Z00.00 PHYSICAL EXAM: Status: ACTIVE | Noted: 2023-12-01

## 2023-12-01 ASSESSMENT — ENCOUNTER SYMPTOMS
EYES NEGATIVE: 1
COUGH: 0
WHEEZING: 0
CONSTIPATION: 0
SHORTNESS OF BREATH: 0
NAUSEA: 0
RHINORRHEA: 0
VOICE CHANGE: 0
PHOTOPHOBIA: 0
CHEST TIGHTNESS: 0
STRIDOR: 0
EYE PAIN: 0
SINUS PRESSURE: 0
EYE ITCHING: 0
VOMITING: 0
COLOR CHANGE: 0
BLOOD IN STOOL: 0
DIARRHEA: 0
EYE DISCHARGE: 0
RECTAL PAIN: 0
ABDOMINAL DISTENTION: 0
BACK PAIN: 0
EYE REDNESS: 0
ANAL BLEEDING: 0
RESPIRATORY NEGATIVE: 1
APNEA: 0
CHOKING: 0
TROUBLE SWALLOWING: 0
ALLERGIC/IMMUNOLOGIC NEGATIVE: 1
SORE THROAT: 0
FACIAL SWELLING: 0
ABDOMINAL PAIN: 0
SINUS PAIN: 0

## 2023-12-01 NOTE — PROGRESS NOTES
SUBJECTIVE  Jarrett Denver is a 61 y.o. female. HPI/Chief C/O:  Chief Complaint   Patient presents with    Follow-up     Pt here for a 6 month check. Pt states she has been feeling much better, has lost weight, and her heart palpitations have greatly decreased. Pt states she is sleeping better now too. No Known Allergies    This 61year old female presents for physical exam. Pt states greatly decreased palpitations and sleep ing better on medication, loosing weight, and healthy diet with no caffeine. Pt denies chest pain and denies shortness of breath. ROS:  Review of Systems   Constitutional:  Positive for fatigue. Negative for activity change, appetite change, chills, diaphoresis, fever and unexpected weight change. HENT: Negative. Negative for congestion, dental problem, drooling, ear discharge, ear pain, facial swelling, hearing loss, mouth sores, nosebleeds, postnasal drip, rhinorrhea, sinus pressure, sinus pain, sneezing, sore throat, tinnitus, trouble swallowing and voice change. Eyes: Negative. Negative for photophobia, pain, discharge, redness, itching and visual disturbance. Respiratory: Negative. Negative for apnea, cough, choking, chest tightness, shortness of breath, wheezing and stridor. Cardiovascular:  Negative for chest pain, palpitations and leg swelling. Gastrointestinal:  Negative for abdominal distention, abdominal pain, anal bleeding, blood in stool, constipation, diarrhea, nausea, rectal pain and vomiting. Endocrine: Negative. Negative for cold intolerance, heat intolerance, polydipsia, polyphagia and polyuria. Genitourinary:  Negative for decreased urine volume, difficulty urinating, dyspareunia, dysuria, enuresis, flank pain, frequency, genital sores, hematuria, menstrual problem, pelvic pain and urgency. Musculoskeletal: Negative. Negative for arthralgias, back pain, gait problem, joint swelling, myalgias, neck pain and neck stiffness. Skin: Negative.
Walk in

## 2023-12-28 NOTE — PATIENT INSTRUCTIONS
[facility]        NAME: Carlos Melgar is a 16 m.o. male  : 2022    MRN: [id number]  DATE: 2023  TIME: 5:25 PM    Assessment and Plan   Acute otitis media, unspecified otitis media type [H66.90]  1. Acute otitis media, unspecified otitis media type  amoxicillin (Amoxil) 250 mg/5 mL oral suspension            Patient Instructions     Take antibiotic as prescribed   PCR sent out for COVID/Flu please expect results within 24-48 hours. Results may be seen on mychart. Will call with any positive results.   Follow up with PCP in 3-5 days.  Proceed to  ER if symptoms worsen.    Chief Complaint     Chief Complaint   Patient presents with    Fever     Possible pain to ears, and Fever of 103.3. some cough.          History of Present Illness       Patient presenting with fever of 103.3 with a cough and possible pain to the ears. Symptoms started last night. Was given Tylenol and Motrin.     Fever  Associated symptoms include congestion, coughing, fatigue and a fever. Pertinent negatives include no nausea or vomiting.       Review of Systems   Review of Systems   Constitutional:  Positive for appetite change, crying, fatigue, fever and irritability.        Poor appetite   HENT:  Positive for congestion, ear pain and rhinorrhea.    Respiratory:  Positive for cough. Negative for wheezing.    Cardiovascular: Negative.    Gastrointestinal: Negative.  Negative for diarrhea, nausea and vomiting.         Current Medications       Current Outpatient Medications:     amoxicillin (Amoxil) 250 mg/5 mL oral suspension, Take 3.7 mL (185 mg total) by mouth 3 (three) times a day for 10 days, Disp: 111 mL, Rfl: 0    Current Allergies     Allergies as of 2023    (No Known Allergies)            The following portions of the patient's history were reviewed and updated as appropriate: allergies, current medications, past family history, past medical history, past social history, past surgical history and  Take metamucil 2 x per day    [address] OR COLON SURGERY    It is very important that you follow all of the instructions listed on this sheet carefully (they may be slightly different than the directions on the product that you purchase at the pharmacy) to ensure that your colon is adequately cleaned out or your risk of complications could be increased. 2 Days or More Before Endoscopy:   Obtain SUPREP from the pharmacy.  Do not eat corn, tomatoes, peas or watermelon 3 to 5 days before procedure. · Two days before the colonoscopy, fill both of the 16 oz bottles in the [facility] with water and put in the refrigerator to get cold  · Start clear liquid diet 2 days before the procedure   If you are on INSULIN or OTHER DIABETIC MEDICATIONS, then check with your primary care physician as to how to adjust your medication while on clear liquid diet and when nothing by mouth. 1 Day Before the Endoscopy:   No solid food - only clear liquids (soup, jello, or juice that you can see through with no solid food) for breakfast, lunch and supper. DO NOT drink or eat anything that is red as it will turn the inside of the colon red and look like blood.  Have at least 8 oz or more of clear liquids for breakfast (7 am to 8 am) and lunch (11:30 am to 12:30 pm).  1:00 pm Drink at least 8 oz of clear liquids.  3:00 pm Drink at least 8 oz of clear liquids.  4:00 pm Drink one of the 16 oz bottles of the SUPREP followed immediately by at least 8 oz of clear liquids.  5:00 pm Drink at least 8 oz of clear liquids.  Can continue to take  clear liquids    Day of Endoscopy:   4 hours prior to scheduled time for colonoscopy, drink the second bottle of SUPREP followed immediately by at least 8 oz of clear liquids.   STOP ALL CLEAR LIQUIDS 2 HOURS PRIOR TO SCHEDULED TIME   If any blood pressure medications or heart medications are due in the morning, you problem list.     Past Medical History:   Diagnosis Date    Jaundice 2022       Past Surgical History:   Procedure Laterality Date    CIRCUMCISION         Family History   Problem Relation Age of Onset    No Known Problems Mother     No Known Problems Father     No Known Problems Maternal Grandmother         Copied from mother's family history at birth    Mental illness Maternal Grandfather         Copied from mother's family history at birth    Substance Abuse Neg Hx          Medications have been verified.        Objective   Pulse 142   Temp 100.2 °F (37.9 °C)   Resp 22   SpO2 94%        Physical Exam     Physical Exam  Vitals reviewed.   Constitutional:       General: He is not in acute distress.     Appearance: He is not toxic-appearing.   HENT:      Right Ear: Tympanic membrane is erythematous.      Left Ear: Tympanic membrane is not erythematous.      Nose: Congestion and rhinorrhea present.      Mouth/Throat:      Mouth: Mucous membranes are moist.      Pharynx: Oropharynx is clear. No oropharyngeal exudate or posterior oropharyngeal erythema.   Eyes:      Conjunctiva/sclera: Conjunctivae normal.   Cardiovascular:      Heart sounds: Normal heart sounds.   Pulmonary:      Effort: Pulmonary effort is normal.      Breath sounds: Normal breath sounds.   Neurological:      Mental Status: He is alert.                    should take them with a sip of water. Instructions for Clear liquid diet  Definition  A clear liquid diet consists of clear liquids, such as water, broth and plain gelatin, that are easily digested and leave no undigested residue in your intestinal tract. Your doctor may prescribe a clear liquid diet before certain medical procedures or if you have certain digestive problems. Because a clear liquid diet can't provide you with adequate calories and nutrients, it shouldn't be continued for more than a few days. Purpose  A clear liquid diet is often used before tests, procedures or surgeries that require no food in your stomach or intestines, such as before colonoscopy. It may also be recommended as a short-term diet if you have certain digestive problems, such as nausea, vomiting or diarrhea, or after certain types of surgery. Diet details  A clear liquid diet helps maintain adequate hydration, provides some important electrolytes, such as sodium and potassium, and gives some energy at a time when a full diet isn't possible or recommended. The following foods are allowed in a clear liquid diet:    Plain water    Fruit juices without pulp, such as apple juice, grape juice or cranberry juice    Strained lemonade or fruit punch    Clear, fat-free broth (bouillon or consomme)    Clear sodas    Plain gelatin    Honey    Ice pops without bits of fruit or fruit pulp    Tea or coffee without milk or cream    Any foods not on the above list should be avoided. Also, for certain tests, such as colon exams, your doctor may ask you to avoid liquids or gelatin with red coloring.      A typical menu on the clear liquid diet may look like this:     Breakfast:  1 glass fruit juice  1 cup coffee or tea (without dairy products)  1 cup broth  1 bowl gelatin     Snack:  1 glass fruit juice  1 bowl gelatin     Lunch:  1 glass fruit juice  1 glass water  1 cup broth  1 bowl gelatin     Snack:  1 ice pop (without fruit pulp)  1 cup coffee or tea (without dairy products) or a soft drink     Dinner:  1 cup juice or water  1 cup broth  1 bowl gelatin  1 cup coffee or tea     Results  Although the clear liquid diet may not be very exciting, it does fulfill its purpose. It's designed to keep your stomach and intestines clear, limit strain to your digestive system, but keep your body hydrated as you prepare for or recover from a medical procedure. Risks  Because a clear liquid diet can't provide you with adequate calories and nutrients, it shouldn't be used for more than a few days. Only use the clear liquid diet as directed by your doctor. If your doctor prescribes a clear liquid diet before a medical test, be sure to follow the diet instructions exactly. If you don't follow the diet exactly, you risk an inaccurate test and may have to reschedule the procedure for another time. The importance of proper hydration  A colonoscopy prep causes the body to lose a significant amount of fluid and can result in sickness due to dehydration. It's important that you prepare your body by drinking extra clear liquids before the prep. Stay hydrated by drinking all required clear liquids during the prep. Replenish your system by drinking clear liquids after returning home from your colonoscopy.

## 2023-12-31 PROBLEM — Z00.00 PHYSICAL EXAM: Status: RESOLVED | Noted: 2023-12-01 | Resolved: 2023-12-31

## 2024-01-17 ENCOUNTER — TELEMEDICINE (OUTPATIENT)
Dept: FAMILY MEDICINE CLINIC | Age: 64
End: 2024-01-17
Payer: COMMERCIAL

## 2024-01-17 DIAGNOSIS — J01.90 ACUTE SINUSITIS, RECURRENCE NOT SPECIFIED, UNSPECIFIED LOCATION: ICD-10-CM

## 2024-01-17 DIAGNOSIS — J40 BRONCHITIS: Primary | ICD-10-CM

## 2024-01-17 DIAGNOSIS — R53.83 OTHER FATIGUE: ICD-10-CM

## 2024-01-17 PROCEDURE — 1036F TOBACCO NON-USER: CPT | Performed by: FAMILY MEDICINE

## 2024-01-17 PROCEDURE — 99213 OFFICE O/P EST LOW 20 MIN: CPT | Performed by: FAMILY MEDICINE

## 2024-01-17 PROCEDURE — 3017F COLORECTAL CA SCREEN DOC REV: CPT | Performed by: FAMILY MEDICINE

## 2024-01-17 PROCEDURE — G8427 DOCREV CUR MEDS BY ELIG CLIN: HCPCS | Performed by: FAMILY MEDICINE

## 2024-01-17 PROCEDURE — G8417 CALC BMI ABV UP PARAM F/U: HCPCS | Performed by: FAMILY MEDICINE

## 2024-01-17 PROCEDURE — G8484 FLU IMMUNIZE NO ADMIN: HCPCS | Performed by: FAMILY MEDICINE

## 2024-01-17 RX ORDER — METHYLPREDNISOLONE 4 MG/1
TABLET ORAL
Qty: 1 KIT | Refills: 0 | Status: SHIPPED | OUTPATIENT
Start: 2024-01-17 | End: 2024-01-23

## 2024-01-17 RX ORDER — GUAIFENESIN 600 MG/1
600 TABLET, EXTENDED RELEASE ORAL 2 TIMES DAILY
Qty: 30 TABLET | Refills: 0 | Status: SHIPPED | OUTPATIENT
Start: 2024-01-17 | End: 2024-02-01

## 2024-01-17 RX ORDER — BUDESONIDE AND FORMOTEROL FUMARATE DIHYDRATE 80; 4.5 UG/1; UG/1
2 AEROSOL RESPIRATORY (INHALATION) 2 TIMES DAILY
Qty: 10.2 G | Refills: 3 | Status: SHIPPED | OUTPATIENT
Start: 2024-01-17

## 2024-01-17 RX ORDER — AZITHROMYCIN 500 MG/1
500 TABLET, FILM COATED ORAL DAILY
Qty: 3 TABLET | Refills: 0 | Status: SHIPPED | OUTPATIENT
Start: 2024-01-17 | End: 2024-01-20

## 2024-01-17 ASSESSMENT — ENCOUNTER SYMPTOMS
CONSTIPATION: 0
SHORTNESS OF BREATH: 0
SINUS PRESSURE: 1
BLOOD IN STOOL: 0
ALLERGIC/IMMUNOLOGIC NEGATIVE: 1
RECTAL PAIN: 0
STRIDOR: 0
VOMITING: 0
SINUS PAIN: 0
ABDOMINAL PAIN: 0
EYE DISCHARGE: 0
SORE THROAT: 1
RHINORRHEA: 0
EYES NEGATIVE: 1
EYE PAIN: 0
ANAL BLEEDING: 0
WHEEZING: 0
RESPIRATORY NEGATIVE: 1
EYE REDNESS: 0
TROUBLE SWALLOWING: 0
ABDOMINAL DISTENTION: 0
FACIAL SWELLING: 0
COLOR CHANGE: 0
CHOKING: 0
PHOTOPHOBIA: 0
APNEA: 0
NAUSEA: 0
DIARRHEA: 0
CHEST TIGHTNESS: 0
EYE ITCHING: 0
COUGH: 0
BACK PAIN: 0
VOICE CHANGE: 0

## 2024-01-17 NOTE — PROGRESS NOTES
notes, test results and face to face (virtual) with the patient discussing the diagnosis and importance of compliance with the treatment plan as well as documenting on the day of the visit. ,     This visit was completed virtually using [email or web address]        C/O sinus with cough and a hoarse voice       ROS:  Review of Systems   Constitutional:  Positive for fatigue. Negative for activity change, appetite change, chills, diaphoresis, fever and unexpected weight change.   HENT:  Positive for congestion, sinus pressure and sore throat. Negative for dental problem, drooling, ear discharge, ear pain, facial swelling, hearing loss, mouth sores, nosebleeds, postnasal drip, rhinorrhea, sinus pain, sneezing, tinnitus, trouble swallowing and voice change.    Eyes: Negative.  Negative for photophobia, pain, discharge, redness, itching and visual disturbance.   Respiratory: Negative.  Negative for apnea, cough, choking, chest tightness, shortness of breath, wheezing and stridor.    Cardiovascular:  Negative for chest pain, palpitations and leg swelling.   Gastrointestinal:  Negative for abdominal distention, abdominal pain, anal bleeding, blood in stool, constipation, diarrhea, nausea, rectal pain and vomiting.   Endocrine: Negative.  Negative for cold intolerance, heat intolerance, polydipsia, polyphagia and polyuria.   Genitourinary:  Negative for decreased urine volume, difficulty urinating, dyspareunia, dysuria, enuresis, flank pain, frequency, genital sores, hematuria, menstrual problem, pelvic pain and urgency.   Musculoskeletal: Negative.  Negative for arthralgias, back pain, gait problem, joint swelling, myalgias, neck pain and neck stiffness.   Skin: Negative.  Negative for color change, pallor, rash and wound.   Allergic/Immunologic: Negative.    Neurological: Negative.  Negative for dizziness, tremors, seizures, syncope, facial asymmetry, speech difficulty, weakness, light-headedness, numbness and headaches.

## 2024-01-18 NOTE — TELEPHONE ENCOUNTER
Patient can not afford inhaler wants to know if covered alternative or generic can be sent     gallbladder removal and gastric bypass

## 2024-01-26 ENCOUNTER — OFFICE VISIT (OUTPATIENT)
Dept: FAMILY MEDICINE CLINIC | Age: 64
End: 2024-01-26
Payer: COMMERCIAL

## 2024-01-26 VITALS
RESPIRATION RATE: 18 BRPM | HEIGHT: 61 IN | BODY MASS INDEX: 28.51 KG/M2 | TEMPERATURE: 97.4 F | OXYGEN SATURATION: 97 % | DIASTOLIC BLOOD PRESSURE: 76 MMHG | SYSTOLIC BLOOD PRESSURE: 126 MMHG | WEIGHT: 151 LBS | HEART RATE: 78 BPM

## 2024-01-26 DIAGNOSIS — R09.81 HEAD CONGESTION: Primary | ICD-10-CM

## 2024-01-26 DIAGNOSIS — B96.89 ACUTE BACTERIAL SINUSITIS: ICD-10-CM

## 2024-01-26 DIAGNOSIS — J01.90 ACUTE BACTERIAL SINUSITIS: ICD-10-CM

## 2024-01-26 PROCEDURE — 96372 THER/PROPH/DIAG INJ SC/IM: CPT | Performed by: FAMILY MEDICINE

## 2024-01-26 PROCEDURE — 99213 OFFICE O/P EST LOW 20 MIN: CPT | Performed by: FAMILY MEDICINE

## 2024-01-26 RX ORDER — FLUTICASONE PROPIONATE AND SALMETEROL XINAFOATE 115; 21 UG/1; UG/1
2 AEROSOL, METERED RESPIRATORY (INHALATION) 2 TIMES DAILY
Qty: 1 EACH | Refills: 0 | Status: CANCELLED
Start: 2024-01-26

## 2024-01-26 RX ORDER — DEXAMETHASONE SODIUM PHOSPHATE 4 MG/ML
4 INJECTION, SOLUTION INTRA-ARTICULAR; INTRALESIONAL; INTRAMUSCULAR; INTRAVENOUS; SOFT TISSUE ONCE
Status: COMPLETED | OUTPATIENT
Start: 2024-01-26 | End: 2024-01-26

## 2024-01-26 RX ADMIN — DEXAMETHASONE SODIUM PHOSPHATE 4 MG: 4 INJECTION, SOLUTION INTRA-ARTICULAR; INTRALESIONAL; INTRAMUSCULAR; INTRAVENOUS; SOFT TISSUE at 11:25

## 2024-01-26 ASSESSMENT — PATIENT HEALTH QUESTIONNAIRE - PHQ9
1. LITTLE INTEREST OR PLEASURE IN DOING THINGS: 0
SUM OF ALL RESPONSES TO PHQ QUESTIONS 1-9: 0
SUM OF ALL RESPONSES TO PHQ QUESTIONS 1-9: 0
2. FEELING DOWN, DEPRESSED OR HOPELESS: 0
SUM OF ALL RESPONSES TO PHQ QUESTIONS 1-9: 0
SUM OF ALL RESPONSES TO PHQ9 QUESTIONS 1 & 2: 0
SUM OF ALL RESPONSES TO PHQ QUESTIONS 1-9: 0

## 2024-01-29 PROBLEM — R09.81 HEAD CONGESTION: Status: ACTIVE | Noted: 2024-01-29

## 2024-01-29 ASSESSMENT — ENCOUNTER SYMPTOMS
ABDOMINAL DISTENTION: 0
CONSTIPATION: 0
EYE ITCHING: 0
FACIAL SWELLING: 0
EYE REDNESS: 0
EYE PAIN: 0
COLOR CHANGE: 0
BLOOD IN STOOL: 0
RHINORRHEA: 0
PHOTOPHOBIA: 0
VOICE CHANGE: 0
WHEEZING: 0
SORE THROAT: 1
STRIDOR: 0
ALLERGIC/IMMUNOLOGIC NEGATIVE: 1
ABDOMINAL PAIN: 0
RECTAL PAIN: 0
TROUBLE SWALLOWING: 0
SINUS PAIN: 0
APNEA: 0
EYE DISCHARGE: 0
BACK PAIN: 0
SHORTNESS OF BREATH: 0
SINUS PRESSURE: 1
VOMITING: 0
ANAL BLEEDING: 0
EYES NEGATIVE: 1
COUGH: 0
NAUSEA: 0
DIARRHEA: 0
CHOKING: 0
RESPIRATORY NEGATIVE: 1
CHEST TIGHTNESS: 0

## 2024-01-29 NOTE — PROGRESS NOTES
SUBJECTIVE  Aparna Hill is a 64 y.o. female.    HPI/Chief C/O:  Chief Complaint   Patient presents with    Congestion     Pt states she has had congestion from her neck and head, drainage down throat, itchy throat, dry cough. Pt states this has been going on since November and had a Zpak last week. Has tested negative for Flu and covid.    Medication Refill     Pt states she never got any of the inhalers because they were too expensive, so would like something generic that will be less expensive.     No Known Allergies    This 64 year old female presents for physical exam. Pt c/o nasal congestion, drainage, and sore throat for 2 months. Pt denies chest pain and denies shortness of breath.   ROS:  Review of Systems   Constitutional:  Positive for fatigue. Negative for activity change, appetite change, chills, diaphoresis, fever and unexpected weight change.   HENT:  Positive for congestion, sinus pressure and sore throat. Negative for dental problem, drooling, ear discharge, ear pain, facial swelling, hearing loss, mouth sores, nosebleeds, postnasal drip, rhinorrhea, sinus pain, sneezing, tinnitus, trouble swallowing and voice change.    Eyes: Negative.  Negative for photophobia, pain, discharge, redness, itching and visual disturbance.   Respiratory: Negative.  Negative for apnea, cough, choking, chest tightness, shortness of breath, wheezing and stridor.    Cardiovascular:  Negative for chest pain, palpitations and leg swelling.   Gastrointestinal:  Negative for abdominal distention, abdominal pain, anal bleeding, blood in stool, constipation, diarrhea, nausea, rectal pain and vomiting.   Endocrine: Negative.  Negative for cold intolerance, heat intolerance, polydipsia, polyphagia and polyuria.   Genitourinary:  Negative for decreased urine volume, difficulty urinating, dyspareunia, dysuria, enuresis, flank pain, frequency, genital sores, hematuria, menstrual problem, pelvic pain and urgency.

## 2024-05-23 ENCOUNTER — HOSPITAL ENCOUNTER (EMERGENCY)
Age: 64
Discharge: HOME | End: 2024-05-23
Attending: STUDENT IN AN ORGANIZED HEALTH CARE EDUCATION/TRAINING PROGRAM
Payer: COMMERCIAL

## 2024-05-23 VITALS
SYSTOLIC BLOOD PRESSURE: 133 MMHG | HEART RATE: 64 BPM | OXYGEN SATURATION: 98 % | WEIGHT: 151 LBS | RESPIRATION RATE: 16 BRPM | DIASTOLIC BLOOD PRESSURE: 77 MMHG | HEIGHT: 62 IN | TEMPERATURE: 98 F | BODY MASS INDEX: 27.79 KG/M2

## 2024-05-23 DIAGNOSIS — S01.81XA FACIAL LACERATION, INITIAL ENCOUNTER: Primary | ICD-10-CM

## 2024-05-23 PROCEDURE — 2500000003 HC RX 250 WO HCPCS: Performed by: STUDENT IN AN ORGANIZED HEALTH CARE EDUCATION/TRAINING PROGRAM

## 2024-05-23 PROCEDURE — 6360000002 HC RX W HCPCS: Performed by: STUDENT IN AN ORGANIZED HEALTH CARE EDUCATION/TRAINING PROGRAM

## 2024-05-23 PROCEDURE — 90714 TD VACC NO PRESV 7 YRS+ IM: CPT | Performed by: STUDENT IN AN ORGANIZED HEALTH CARE EDUCATION/TRAINING PROGRAM

## 2024-05-23 PROCEDURE — 99284 EMERGENCY DEPT VISIT MOD MDM: CPT

## 2024-05-23 PROCEDURE — 90471 IMMUNIZATION ADMIN: CPT | Performed by: STUDENT IN AN ORGANIZED HEALTH CARE EDUCATION/TRAINING PROGRAM

## 2024-05-23 PROCEDURE — 12013 RPR F/E/E/N/L/M 2.6-5.0 CM: CPT

## 2024-05-23 RX ORDER — BACITRACIN ZINC 500 [USP'U]/G
OINTMENT TOPICAL ONCE
Status: DISCONTINUED | OUTPATIENT
Start: 2024-05-23 | End: 2024-05-23 | Stop reason: HOSPADM

## 2024-05-23 RX ORDER — LIDOCAINE HYDROCHLORIDE AND EPINEPHRINE 10; 10 MG/ML; UG/ML
20 INJECTION, SOLUTION INFILTRATION; PERINEURAL ONCE
Status: COMPLETED | OUTPATIENT
Start: 2024-05-23 | End: 2024-05-23

## 2024-05-23 RX ORDER — GINSENG 100 MG
CAPSULE ORAL
Qty: 14 G | Refills: 0 | Status: SHIPPED | OUTPATIENT
Start: 2024-05-23 | End: 2024-06-02

## 2024-05-23 RX ADMIN — CLOSTRIDIUM TETANI TOXOID ANTIGEN (FORMALDEHYDE INACTIVATED) AND CORYNEBACTERIUM DIPHTHERIAE TOXOID ANTIGEN (FORMALDEHYDE INACTIVATED) 0.5 ML: 5; 2 INJECTION, SUSPENSION INTRAMUSCULAR at 10:22

## 2024-05-23 RX ADMIN — LIDOCAINE HYDROCHLORIDE,EPINEPHRINE BITARTRATE 20 ML: 10; .01 INJECTION, SOLUTION INFILTRATION; PERINEURAL at 10:22

## 2024-05-23 ASSESSMENT — PAIN SCALES - GENERAL
PAINLEVEL_OUTOF10: 0
PAINLEVEL_OUTOF10: 0

## 2024-05-23 ASSESSMENT — PAIN - FUNCTIONAL ASSESSMENT: PAIN_FUNCTIONAL_ASSESSMENT: 0-10

## 2024-05-23 NOTE — DISCHARGE INSTRUCTIONS
Keep wound clean and dry  Apply topical antibiotic cream twice daily  Follow-up in 1 week for suture removal  If you notice any new worrisome symptom please return to emergency department for evaluation

## 2024-05-25 NOTE — ED PROVIDER NOTES
[facility] EMERGENCY DEPARTMENT  EMERGENCY DEPARTMENT ENCOUNTER      Pt Name: Aparna Hill  MRN: [id number]  Birthdate 1960  Date of evaluation: 5/23/2024  Provider: Juan Alvarado DO  PCP: Halle Conteh DO      CHIEF COMPLAINT       Chief Complaint   Patient presents with    Laceration     Hit right side of  face on garage door        HISTORY OF PRESENT ILLNESS: 1 or more Elements   History From: Patient  Limitations to history : None    Aparna Hill is a 64 y.o. female past medical history of hyperlipidemia, anxiety.  Patient presents with chief complaint of right-sided facial laceration.  Patient states that she was in her joint and she struck her head on the garage door.  Patient has a laceration to the right side of her face patient states that bleeding has been currently well-controlled.  Patient notes minimal headache.  She denies any loss of consciousness.  Patient cannot recall last tetanus.  Patient denies any numbness tingling or weakness    Nursing Notes were all reviewed and agreed with or any disagreements were addressed in the HPI.    REVIEW OF SYSTEMS :    Positives and Pertinent negatives as per HPI.     PAST MEDICAL HISTORY/Chronic Conditions Affecting Care    has a past medical history of Anxiety (2/13/2023), Mixed hyperlipidemia (6/12/2020), and Moderate obesity (1/13/2023).     SURGICAL HISTORY     Past Surgical History:   Procedure Laterality Date    BREAST LUMPECTOMY Right     COLONOSCOPY N/A 3/14/2022    COLONOSCOPY POLYPECTOMY SNARE/COLD BIOPSY performed by Kyrie Ronquillo MD at [facility] ENDOSCOPY    TONSILLECTOMY      UPPER GASTROINTESTINAL ENDOSCOPY N/A 3/14/2022    EGD BIOPSY performed by Kyrie Ronquillo MD at [facility] ENDOSCOPY       CURRENTMEDICATIONS       Discharge Medication List as of 5/23/2024 11:28 AM        CONTINUE these medications which have NOT CHANGED    Details   escitalopram (LEXAPRO) 10 MG tablet TAKE 1/2 TABLET BY MOUTH ONCE

## 2024-05-30 ENCOUNTER — OFFICE VISIT (OUTPATIENT)
Dept: FAMILY MEDICINE CLINIC | Age: 64
End: 2024-05-30

## 2024-05-30 VITALS
RESPIRATION RATE: 18 BRPM | DIASTOLIC BLOOD PRESSURE: 70 MMHG | WEIGHT: 153.4 LBS | HEIGHT: 62 IN | OXYGEN SATURATION: 97 % | SYSTOLIC BLOOD PRESSURE: 124 MMHG | BODY MASS INDEX: 28.23 KG/M2 | HEART RATE: 86 BPM | TEMPERATURE: 97.9 F

## 2024-05-30 DIAGNOSIS — Z48.02 VISIT FOR SUTURE REMOVAL: Primary | ICD-10-CM

## 2024-05-30 SDOH — ECONOMIC STABILITY: FOOD INSECURITY: WITHIN THE PAST 12 MONTHS, THE FOOD YOU BOUGHT JUST DIDN'T LAST AND YOU DIDN'T HAVE MONEY TO GET MORE.: NEVER TRUE

## 2024-05-30 SDOH — ECONOMIC STABILITY: FOOD INSECURITY: WITHIN THE PAST 12 MONTHS, YOU WORRIED THAT YOUR FOOD WOULD RUN OUT BEFORE YOU GOT MONEY TO BUY MORE.: NEVER TRUE

## 2024-05-30 SDOH — ECONOMIC STABILITY: INCOME INSECURITY: HOW HARD IS IT FOR YOU TO PAY FOR THE VERY BASICS LIKE FOOD, HOUSING, MEDICAL CARE, AND HEATING?: NOT HARD AT ALL

## 2024-05-30 NOTE — PROGRESS NOTES
[facility]  Family Medicine Outpatient    Patient Care Team:  Halle Conteh DO as PCP - General  Halle Conteh DO as PCP - Empaneled Provider    SUBJECTIVE:  CC: had concerns including Other (Pt hit her right eyebrow on the metal garage door handle when she was opening the door and a wasp flew in front of her face. Pt was seen at [facility] for the cut and had 5 sutures put in her right eyebrow. Pt had the sutures put in a week ago and was told to see PCP in 7 days.) and ER follow up ([facility] follow up (see above)).  HPI:  Aparna Hill is a female 64 y.o. presented to the clinic for a suture removal over her right eyebrow. Sutures placed 5/23. Denies any acute concerns.     Review of Systems   Constitutional:  Negative for appetite change, fatigue and fever.   Respiratory:  Negative for cough, shortness of breath and wheezing.    Cardiovascular:  Negative for chest pain and palpitations.   Gastrointestinal:  Negative for abdominal pain, constipation, diarrhea, nausea and vomiting.   Neurological:  Negative for dizziness, seizures and headaches.       Outpatient Medications Marked as Taking for the 5/30/24 encounter (Office Visit) with Belinda Cao MD   Medication Sig Dispense Refill    bacitracin 500 UNIT/GM ointment Apply topically 2 times daily. 14 g 0    escitalopram (LEXAPRO) 10 MG tablet TAKE 1/2 TABLET BY MOUTH ONCE DAILY X7 DAYS, THEN INCREASE TO 1 TABLET DAILY      traZODone (DESYREL) 100 MG tablet Take 1 tablet by mouth nightly One tablet tablet nightly      FIBER COMPLETE PO Take by mouth daily PRN         I have reviewed all pertinent PMHx, PSHx, FamHx, SocialHx, medications, and allergies and updated history as appropriate.    OBJECTIVE    VS: /70 (Site: Left Upper Arm, Position: Sitting)   Pulse 86   Temp 97.9 °F (36.6 °C) (Temporal)   Resp 18   Ht 1.575 m (5' 2.01\")   Wt 69.6 kg (153 lb 6.4 oz)   LMP 06/10/2016 (Exact Date)   SpO2 97%   BMI 28.05

## 2024-06-01 ASSESSMENT — ENCOUNTER SYMPTOMS
VOMITING: 0
SHORTNESS OF BREATH: 0
COUGH: 0
ABDOMINAL PAIN: 0
DIARRHEA: 0
CONSTIPATION: 0
WHEEZING: 0
NAUSEA: 0

## 2024-06-26 ENCOUNTER — TELEPHONE (OUTPATIENT)
Dept: SURGERY | Age: 64
End: 2024-06-26

## 2024-06-26 ENCOUNTER — PREP FOR PROCEDURE (OUTPATIENT)
Dept: SURGERY | Age: 64
End: 2024-06-26

## 2024-06-26 ENCOUNTER — OFFICE VISIT (OUTPATIENT)
Dept: SURGERY | Age: 64
End: 2024-06-26
Payer: COMMERCIAL

## 2024-06-26 VITALS
HEIGHT: 61 IN | SYSTOLIC BLOOD PRESSURE: 121 MMHG | DIASTOLIC BLOOD PRESSURE: 78 MMHG | RESPIRATION RATE: 16 BRPM | HEART RATE: 79 BPM | WEIGHT: 152 LBS | BODY MASS INDEX: 28.7 KG/M2

## 2024-06-26 DIAGNOSIS — R10.13 EPIGASTRIC ABDOMINAL PAIN: Primary | ICD-10-CM

## 2024-06-26 PROBLEM — K21.9 GERD (GASTROESOPHAGEAL REFLUX DISEASE): Status: ACTIVE | Noted: 2024-06-26

## 2024-06-26 PROCEDURE — 99214 OFFICE O/P EST MOD 30 MIN: CPT | Performed by: SURGERY

## 2024-06-26 RX ORDER — OMEPRAZOLE 20 MG/1
20 CAPSULE, DELAYED RELEASE ORAL
Qty: 90 CAPSULE | Refills: 3 | Status: SHIPPED | OUTPATIENT
Start: 2024-06-26

## 2024-06-26 NOTE — TELEPHONE ENCOUNTER
Prior Authorization Form:      DEMOGRAPHICS:                     Patient Name:  Consuelo Hill  Patient :  1960            Insurance:  Payor: [employer] / Plan: [facility] BRON[employer] SILVER [employer] SANDRA / Product Type: *No Product type* /   Insurance ID Number:    Payer/Plan Subscr  Sex Relation Sub. Ins. ID Effective Group Num   1. [facility]* CONSUELO HILL 1960 Female Self [id number]                                   [address]         DIAGNOSIS & PROCEDURE:                       Procedure/Operation: EGD           CPT Code: 72680    Diagnosis:  GERD    ICD10 Code: K21.9    [facility]  [facility]    Surgeon:  Kyrie Ronquillo MD      SCHEDULING INFORMATION:                          Date: 2024    Time: 0900              Anesthesia:  MAC/TIVA                                                       Status:  Outpatient      Electronically signed by Giselle Klein MA on 2024 at 1:27 PM

## 2024-06-26 NOTE — PROGRESS NOTES
[facility]  General Surgery Attending Office Progress Note    Chief Complaint: epigastric pain       Subjective:   Aparna Hill complains of  epigastric pain that started 6 months ago. She wuit taking prilosec 1 year ago. She develops this pain after eating. BBQ foods, pizza, sauce make it worse. She still has her gallbladder. No nausea/ vomiting. If she avoids BBQ, spaghetti sauce, she feels better. She ate pork belly with sauce and she felt pain. The pain  . She drinks water and it feels better. Pt was taking tums which helps.     Tolerating diet  No fevers. No chills. No nausea/ vomiting    --I have reviewed and confirmed the past medical history, surgical history, social history, allergies in the chart.  --Medications: I have reviewed the medication list in the chart.    --Review of systems-pertinent positives noted in HPI, otherwise negative      Objective:     Vitals:    06/26/24 1254   BP: 121/78   Pulse: 79   Resp: 16     Physical Exam  Awake alert x3, GCS 15  No apparent distress  Lungs clear bilaterally, no use of accessory muscles  Heart S1S2, RRR  Abdomen soft mild epigastric tendernessnondistended       Assessment:      Diagnosis Orders   1. Epigastric abdominal pain            Plan:   - I have reviewed the prior progress note from the ED provider visit on 5/23/24  .  -I have reviewed the progress note from the PCP visit on 5/30/24.    --epigastric pain  --Prescription management--will start on prilosec 20 mg daily. This could be secondary to peptic ulcer disease vs gastritis  Will plan on EGD.  I have discussed the risks, benefits, and alternatives to esophagogastroduodenoscopy with possible biopsy with deep sedation with the patient. I have detailed the risks of deep sedation (hypotension, hypoxia) as well as complications of bleeding and perforation.  The patient understands the above and agrees to proceed.     --constipation--pt still takes 1 fiber tablet. She is regular with

## 2024-07-11 ENCOUNTER — TELEPHONE (OUTPATIENT)
Dept: SURGERY | Age: 64
End: 2024-07-11

## 2024-07-16 NOTE — TELEPHONE ENCOUNTER
MA returned pt call to inform her that Dr. Ronquillo's MA is currently out of the office. MA informed pt that procedure would be cx, and Giselle would return call at her earliest convenience to reschedule. Pt verbalized understanding.    Electronically signed by Yue Escobedo MA on 7/16/2024 at 2:04 PM

## 2024-07-18 ENCOUNTER — OFFICE VISIT (OUTPATIENT)
Dept: ENT CLINIC | Age: 64
End: 2024-07-18
Payer: COMMERCIAL

## 2024-07-18 ENCOUNTER — PROCEDURE VISIT (OUTPATIENT)
Dept: AUDIOLOGY | Age: 64
End: 2024-07-18
Payer: COMMERCIAL

## 2024-07-18 VITALS
HEIGHT: 61 IN | BODY MASS INDEX: 29.07 KG/M2 | DIASTOLIC BLOOD PRESSURE: 76 MMHG | HEART RATE: 64 BPM | WEIGHT: 154 LBS | SYSTOLIC BLOOD PRESSURE: 115 MMHG

## 2024-07-18 DIAGNOSIS — H90.3 SENSORINEURAL HEARING LOSS (SNHL) OF BOTH EARS: ICD-10-CM

## 2024-07-18 DIAGNOSIS — H93.13 TINNITUS OF BOTH EARS: Primary | ICD-10-CM

## 2024-07-18 PROCEDURE — 92567 TYMPANOMETRY: CPT | Performed by: AUDIOLOGIST

## 2024-07-18 PROCEDURE — 99213 OFFICE O/P EST LOW 20 MIN: CPT

## 2024-07-18 PROCEDURE — 92557 COMPREHENSIVE HEARING TEST: CPT | Performed by: AUDIOLOGIST

## 2024-07-18 ASSESSMENT — ENCOUNTER SYMPTOMS
COUGH: 0
ALLERGIC/IMMUNOLOGIC NEGATIVE: 1
DIARRHEA: 0
SINUS PRESSURE: 0
EYE DISCHARGE: 0
EYE PAIN: 0
BACK PAIN: 0
SHORTNESS OF BREATH: 0
SORE THROAT: 0
RHINORRHEA: 0
VOMITING: 0

## 2024-07-18 NOTE — PROGRESS NOTES
and Throat    The information contained in this note has been dictated using drug and medical speech recognition software and may contain errors

## 2024-07-18 NOTE — PROGRESS NOTES
This patient was referred for audiometric/tympanometric testing by SANDEEP Tolliver for an annual audiogram. No significant changes to hearing. Tinnitus still a constant annoyance.     Audiometry using pure tone air and bone conduction revealed normal hearing sensitivity through 500Hz, gradually sloping to a moderate sensorineural hearing loss at 3kHz bilaterally. Reliability was good. Speech reception thresholds were in good agreement with the pure tone averages, bilaterally. Speech discrimination scores were excellent, bilaterally.    Tympanometry revealed normal middle ear peak pressure and compliance, bilaterally.    The results were reviewed with the patient and CNP.     Recommendations for follow up will be made pending ordering provider consult.    SHERYL Tomas.     Dominique Montero, Leonor/CCC-A  [place] Lic # [id number]

## 2024-08-13 ENCOUNTER — TELEPHONE (OUTPATIENT)
Dept: SURGERY | Age: 64
End: 2024-08-13

## 2024-08-13 NOTE — TELEPHONE ENCOUNTER
LPN received call from patient wanting to rescheduled cancelled endoscopy with Dr Ronquillo previously scheduled for 8/26. Patient can be reached at [phone]    Routing to FERNANDO Desai for surgery scheduling purposes.     Electronically signed by Patrice Tillman LPN on 8/13/24 at 3:25 PM EDT

## 2024-08-14 ENCOUNTER — PREP FOR PROCEDURE (OUTPATIENT)
Dept: SURGERY | Age: 64
End: 2024-08-14

## 2024-08-14 DIAGNOSIS — R10.13 EPIGASTRIC PAIN: ICD-10-CM

## 2024-08-14 NOTE — TELEPHONE ENCOUNTER
MA Scheduled pt for EGD on 09/23/2024 at 1245. Pt needs to arrive at [facility] at 1145. Patient confirmed date and time. Address and directions given.   Electronically signed by Giselle Klein MA on 8/14/24 at 10:25 AM EDT

## 2024-09-23 ENCOUNTER — HOSPITAL ENCOUNTER (OUTPATIENT)
Age: 64
Setting detail: OUTPATIENT SURGERY
Discharge: HOME | End: 2024-09-23
Attending: SURGERY | Admitting: SURGERY
Payer: COMMERCIAL

## 2024-09-23 ENCOUNTER — ANESTHESIA (OUTPATIENT)
Dept: ENDOSCOPY | Age: 64
End: 2024-09-23
Payer: COMMERCIAL

## 2024-09-23 ENCOUNTER — ANESTHESIA EVENT (OUTPATIENT)
Dept: ENDOSCOPY | Age: 64
End: 2024-09-23
Payer: COMMERCIAL

## 2024-09-23 VITALS
BODY MASS INDEX: 28.32 KG/M2 | OXYGEN SATURATION: 97 % | HEART RATE: 50 BPM | TEMPERATURE: 97.3 F | WEIGHT: 150 LBS | HEIGHT: 61 IN | DIASTOLIC BLOOD PRESSURE: 65 MMHG | RESPIRATION RATE: 14 BRPM | SYSTOLIC BLOOD PRESSURE: 122 MMHG

## 2024-09-23 DIAGNOSIS — R10.13 EPIGASTRIC PAIN: ICD-10-CM

## 2024-09-23 PROCEDURE — 3700000000 HC ANESTHESIA ATTENDED CARE: Performed by: SURGERY

## 2024-09-23 PROCEDURE — 88342 IMHCHEM/IMCYTCHM 1ST ANTB: CPT

## 2024-09-23 PROCEDURE — 43239 EGD BIOPSY SINGLE/MULTIPLE: CPT | Performed by: SURGERY

## 2024-09-23 PROCEDURE — 7100000011 HC PHASE II RECOVERY - ADDTL 15 MIN: Performed by: SURGERY

## 2024-09-23 PROCEDURE — 2580000003 HC RX 258: Performed by: SURGERY

## 2024-09-23 PROCEDURE — 3700000001 HC ADD 15 MINUTES (ANESTHESIA): Performed by: SURGERY

## 2024-09-23 PROCEDURE — 88305 TISSUE EXAM BY PATHOLOGIST: CPT

## 2024-09-23 PROCEDURE — 6360000002 HC RX W HCPCS

## 2024-09-23 PROCEDURE — 7100000010 HC PHASE II RECOVERY - FIRST 15 MIN: Performed by: SURGERY

## 2024-09-23 PROCEDURE — 2709999900 HC NON-CHARGEABLE SUPPLY: Performed by: SURGERY

## 2024-09-23 PROCEDURE — 3609012400 HC EGD TRANSORAL BIOPSY SINGLE/MULTIPLE: Performed by: SURGERY

## 2024-09-23 RX ORDER — SODIUM CHLORIDE 9 MG/ML
INJECTION, SOLUTION INTRAVENOUS CONTINUOUS
Status: DISCONTINUED | OUTPATIENT
Start: 2024-09-23 | End: 2024-09-23 | Stop reason: HOSPADM

## 2024-09-23 RX ORDER — SODIUM CHLORIDE 0.9 % (FLUSH) 0.9 %
5-40 SYRINGE (ML) INJECTION PRN
Status: DISCONTINUED | OUTPATIENT
Start: 2024-09-23 | End: 2024-09-23 | Stop reason: HOSPADM

## 2024-09-23 RX ORDER — FAMOTIDINE 40 MG/1
40 TABLET, FILM COATED ORAL EVERY EVENING
Qty: 90 TABLET | Refills: 3 | Status: SHIPPED | OUTPATIENT
Start: 2024-09-23 | End: 2025-09-18

## 2024-09-23 RX ORDER — LIDOCAINE HYDROCHLORIDE 20 MG/ML
INJECTION, SOLUTION INTRAVENOUS
Status: DISCONTINUED | OUTPATIENT
Start: 2024-09-23 | End: 2024-09-23 | Stop reason: SDUPTHER

## 2024-09-23 RX ORDER — SODIUM CHLORIDE 9 MG/ML
INJECTION, SOLUTION INTRAVENOUS PRN
Status: DISCONTINUED | OUTPATIENT
Start: 2024-09-23 | End: 2024-09-23 | Stop reason: HOSPADM

## 2024-09-23 RX ORDER — SODIUM CHLORIDE 0.9 % (FLUSH) 0.9 %
5-40 SYRINGE (ML) INJECTION EVERY 12 HOURS SCHEDULED
Status: DISCONTINUED | OUTPATIENT
Start: 2024-09-23 | End: 2024-09-23 | Stop reason: HOSPADM

## 2024-09-23 RX ORDER — PROPOFOL 10 MG/ML
INJECTION, EMULSION INTRAVENOUS
Status: DISCONTINUED | OUTPATIENT
Start: 2024-09-23 | End: 2024-09-23 | Stop reason: SDUPTHER

## 2024-09-23 RX ADMIN — LIDOCAINE HYDROCHLORIDE 50 MG: 20 INJECTION, SOLUTION INTRAVENOUS at 11:07

## 2024-09-23 RX ADMIN — SODIUM CHLORIDE: 9 INJECTION, SOLUTION INTRAVENOUS at 11:06

## 2024-09-23 RX ADMIN — PROPOFOL 30 MG: 10 INJECTION, EMULSION INTRAVENOUS at 11:11

## 2024-09-23 RX ADMIN — PROPOFOL 60 MG: 10 INJECTION, EMULSION INTRAVENOUS at 11:07

## 2024-09-23 RX ADMIN — PROPOFOL 30 MG: 10 INJECTION, EMULSION INTRAVENOUS at 11:09

## 2024-09-23 ASSESSMENT — PAIN - FUNCTIONAL ASSESSMENT
PAIN_FUNCTIONAL_ASSESSMENT: NONE - DENIES PAIN
PAIN_FUNCTIONAL_ASSESSMENT: NONE - DENIES PAIN

## 2024-09-26 LAB — SURGICAL PATHOLOGY REPORT: NORMAL

## 2024-12-11 ENCOUNTER — TELEPHONE (OUTPATIENT)
Dept: FAMILY MEDICINE CLINIC | Age: 64
End: 2024-12-11

## 2024-12-11 DIAGNOSIS — Z12.31 SCREENING MAMMOGRAM FOR BREAST CANCER: Primary | ICD-10-CM

## 2024-12-13 LAB — MAMMOGRAPHY, EXTERNAL: NORMAL

## 2025-06-20 ENCOUNTER — OFFICE VISIT (OUTPATIENT)
Dept: FAMILY MEDICINE CLINIC | Age: 65
End: 2025-06-20

## 2025-06-20 VITALS
SYSTOLIC BLOOD PRESSURE: 116 MMHG | OXYGEN SATURATION: 98 % | TEMPERATURE: 97.3 F | HEIGHT: 61 IN | RESPIRATION RATE: 18 BRPM | BODY MASS INDEX: 29.27 KG/M2 | HEART RATE: 70 BPM | WEIGHT: 155 LBS | DIASTOLIC BLOOD PRESSURE: 72 MMHG

## 2025-06-20 DIAGNOSIS — Z13.5 DIABETIC RETINOPATHY SCREENING: Primary | ICD-10-CM

## 2025-06-20 DIAGNOSIS — L30.9 DERMATITIS: ICD-10-CM

## 2025-06-20 LAB — HBA1C MFR BLD: 5.5 %

## 2025-06-20 RX ORDER — METHYLPREDNISOLONE 4 MG/1
TABLET ORAL
Qty: 1 KIT | Refills: 0 | Status: SHIPPED | OUTPATIENT
Start: 2025-06-20 | End: 2025-06-26

## 2025-06-20 RX ORDER — DEXAMETHASONE SODIUM PHOSPHATE 4 MG/ML
4 INJECTION, SOLUTION INTRA-ARTICULAR; INTRALESIONAL; INTRAMUSCULAR; INTRAVENOUS; SOFT TISSUE ONCE
Status: COMPLETED | OUTPATIENT
Start: 2025-06-20 | End: 2025-06-20

## 2025-06-20 RX ADMIN — DEXAMETHASONE SODIUM PHOSPHATE 4 MG: 4 INJECTION, SOLUTION INTRA-ARTICULAR; INTRALESIONAL; INTRAMUSCULAR; INTRAVENOUS; SOFT TISSUE at 10:57

## 2025-06-20 SDOH — ECONOMIC STABILITY: FOOD INSECURITY: WITHIN THE PAST 12 MONTHS, THE FOOD YOU BOUGHT JUST DIDN'T LAST AND YOU DIDN'T HAVE MONEY TO GET MORE.: NEVER TRUE

## 2025-06-20 SDOH — ECONOMIC STABILITY: FOOD INSECURITY: WITHIN THE PAST 12 MONTHS, YOU WORRIED THAT YOUR FOOD WOULD RUN OUT BEFORE YOU GOT MONEY TO BUY MORE.: NEVER TRUE

## 2025-06-20 ASSESSMENT — PATIENT HEALTH QUESTIONNAIRE - PHQ9
1. LITTLE INTEREST OR PLEASURE IN DOING THINGS: NOT AT ALL
SUM OF ALL RESPONSES TO PHQ QUESTIONS 1-9: 0
SUM OF ALL RESPONSES TO PHQ QUESTIONS 1-9: 0
2. FEELING DOWN, DEPRESSED OR HOPELESS: NOT AT ALL
SUM OF ALL RESPONSES TO PHQ QUESTIONS 1-9: 0
SUM OF ALL RESPONSES TO PHQ QUESTIONS 1-9: 0

## 2025-06-26 PROBLEM — Z13.5 DIABETIC RETINOPATHY SCREENING: Status: ACTIVE | Noted: 2025-06-26

## 2025-06-26 ASSESSMENT — ENCOUNTER SYMPTOMS
NAUSEA: 0
VOICE CHANGE: 0
BACK PAIN: 0
CONSTIPATION: 0
CHOKING: 0
ABDOMINAL PAIN: 0
WHEEZING: 0
VOMITING: 0
EYE PAIN: 0
CHEST TIGHTNESS: 0
PHOTOPHOBIA: 0
RHINORRHEA: 0
EYE ITCHING: 0
APNEA: 0
TROUBLE SWALLOWING: 0
SINUS PRESSURE: 0
SORE THROAT: 0
RESPIRATORY NEGATIVE: 1
SHORTNESS OF BREATH: 0
DIARRHEA: 0
ABDOMINAL DISTENTION: 0
RECTAL PAIN: 0
STRIDOR: 0
FACIAL SWELLING: 0
COLOR CHANGE: 0
BLOOD IN STOOL: 0
SINUS PAIN: 0
ALLERGIC/IMMUNOLOGIC NEGATIVE: 1
EYE DISCHARGE: 0
EYE REDNESS: 0
ANAL BLEEDING: 0
COUGH: 0

## 2025-06-26 NOTE — PROGRESS NOTES
SUBJECTIVE  Aparna Hill is a 65 y.o. female.    HPI/Chief C/O:  Chief Complaint   Patient presents with    Other     Pt has GURDEEP in left eye (was recently seen at the eye specialist for painful left eye).    Poison Ivy     Day before yesterday started with poison ivy left side, left hand, right side of face but states she gets it full blown and trying to catch it early D/T going on vacation next week.     No Known Allergies    This 65 year old female presents with poison ivy for 2 days on face, and hand. Pt has GURDEEP in left eye, follows with ophthalmology. Pt denies chest pain and denies shortness of breath.     ROS:  Review of Systems   Constitutional:  Positive for fatigue. Negative for activity change, appetite change, chills, diaphoresis, fever and unexpected weight change.   HENT:  Positive for congestion. Negative for dental problem, drooling, ear discharge, ear pain, facial swelling, hearing loss, mouth sores, nosebleeds, postnasal drip, rhinorrhea, sinus pressure, sinus pain, sneezing, sore throat, tinnitus, trouble swallowing and voice change.    Eyes:  Positive for visual disturbance. Negative for photophobia, pain, discharge, redness and itching.   Respiratory: Negative.  Negative for apnea, cough, choking, chest tightness, shortness of breath, wheezing and stridor.    Cardiovascular:  Negative for chest pain, palpitations and leg swelling.   Gastrointestinal:  Negative for abdominal distention, abdominal pain, anal bleeding, blood in stool, constipation, diarrhea, nausea, rectal pain and vomiting.   Endocrine: Negative.  Negative for cold intolerance, heat intolerance, polydipsia, polyphagia and polyuria.   Genitourinary:  Negative for decreased urine volume, difficulty urinating, dyspareunia, dysuria, enuresis, flank pain, frequency, genital sores, hematuria, menstrual problem, pelvic pain and urgency.   Musculoskeletal: Negative.  Negative for arthralgias, back pain, gait problem, joint swelling,

## 2025-07-10 ENCOUNTER — TELEPHONE (OUTPATIENT)
Age: 65
End: 2025-07-10

## 2025-07-10 NOTE — TELEPHONE ENCOUNTER
Patient called in for refill of Omeprazole to be sent to [facility] pharmacy.  Forwarding to DR. Ronquillo for further advisement.  Electronically signed by Giselle Klein MA on 7/10/25 at 4:02 PM EDT

## 2025-07-11 RX ORDER — FAMOTIDINE 40 MG/1
40 TABLET, FILM COATED ORAL EVERY EVENING
Qty: 90 TABLET | Refills: 3 | Status: SHIPPED | OUTPATIENT
Start: 2025-07-11 | End: 2026-07-06

## 2025-07-11 RX ORDER — OMEPRAZOLE 20 MG/1
20 CAPSULE, DELAYED RELEASE ORAL
Qty: 90 CAPSULE | Refills: 3 | Status: SHIPPED | OUTPATIENT
Start: 2025-07-11 | End: 2026-07-06

## 2025-08-01 ENCOUNTER — OFFICE VISIT (OUTPATIENT)
Dept: ENT CLINIC | Age: 65
End: 2025-08-01
Payer: MEDICARE

## 2025-08-01 ENCOUNTER — PROCEDURE VISIT (OUTPATIENT)
Dept: AUDIOLOGY | Age: 65
End: 2025-08-01

## 2025-08-01 VITALS
DIASTOLIC BLOOD PRESSURE: 77 MMHG | OXYGEN SATURATION: 98 % | RESPIRATION RATE: 16 BRPM | SYSTOLIC BLOOD PRESSURE: 120 MMHG | TEMPERATURE: 97.3 F | WEIGHT: 155 LBS | HEIGHT: 60 IN | BODY MASS INDEX: 30.43 KG/M2 | HEART RATE: 70 BPM

## 2025-08-01 DIAGNOSIS — H90.3 SENSORINEURAL HEARING LOSS (SNHL) OF BOTH EARS: Primary | ICD-10-CM

## 2025-08-01 DIAGNOSIS — H93.13 TINNITUS OF BOTH EARS: ICD-10-CM

## 2025-08-01 PROCEDURE — 99213 OFFICE O/P EST LOW 20 MIN: CPT | Performed by: NURSE PRACTITIONER

## 2025-08-01 PROCEDURE — 1123F ACP DISCUSS/DSCN MKR DOCD: CPT | Performed by: NURSE PRACTITIONER

## 2025-08-01 ASSESSMENT — ENCOUNTER SYMPTOMS
SHORTNESS OF BREATH: 0
SINUS PAIN: 0
STRIDOR: 0
SINUS PRESSURE: 0
RESPIRATORY NEGATIVE: 1
RHINORRHEA: 0
EYES NEGATIVE: 1

## 2025-08-01 NOTE — PROGRESS NOTES
DEPARTMENT OF OTOLARYNGOLOGY  JEREMY ConstantinoO., Ms. Ed.  JEREMY MuhammadO.  Ravi Love, MSN, FNP-C        Patient Name:  Aparna Hill  :  1960     CHIEF C/O:    Chief Complaint   Patient presents with    Follow-up     1 year audio       HISTORY OBTAINED FROM:  patient    HISTORY OF PRESENT ILLNESS:       Aparna is a 65 y.o. year old female, here today for follow up of:       History of Present Illness  The patient is a 65-year-old female who presents for her yearly audiology checkup.    She reports no noticeable changes in her hearing over the past year. She does not experience any ear pain or pressure but mentions persistent ringing in her ears. She has not had any ear infections within the last year. Her sinuses are functioning well, with no symptoms of congestion, runny nose, postnasal drainage, sinus pain, or pressure.        Past Medical History:   Diagnosis Date    Anxiety 2023    GERD (gastroesophageal reflux disease) 2024    Mixed hyperlipidemia 2020    Moderate obesity 2023     Past Surgical History:   Procedure Laterality Date    BREAST LUMPECTOMY Right     COLONOSCOPY N/A 3/14/2022    COLONOSCOPY POLYPECTOMY SNARE/COLD BIOPSY performed by Kyrie Ronquillo MD at [facility]    TONSILLECTOMY      UPPER GASTROINTESTINAL ENDOSCOPY N/A 3/14/2022    EGD BIOPSY performed by Kyrie Ronquillo MD at [facility]    UPPER GASTROINTESTINAL ENDOSCOPY N/A 2024    ESOPHAGOGASTRODUODENOSCOPY, POSSIBLE BIOPSY performed by Kyrie Ronquillo MD at [facility]       Current Outpatient Medications:     omeprazole (PRILOSEC) 20 MG delayed release capsule, Take 1 capsule by mouth every morning (before breakfast), Disp: 90 capsule, Rfl: 3    famotidine (PEPCID) 40 MG tablet, Take 1 tablet by mouth every evening, Disp: 90 tablet, Rfl: 3    famotidine (PEPCID) 40 MG tablet, Take 1 tablet by mouth every evening, Disp: 90 tablet, Rfl: 3    omeprazole (PRILOSEC) 20

## 2025-08-01 NOTE — PROGRESS NOTES
This patient was referred for audiometric and tympanometric testing by SANDEEP He due to hearing loss and tinnitus.     Audiometry using pure tone air and bone conduction testing revealed hearing sensitivity within normal limits through 1000 Hz sloping to a moderately-severe  sensorineural hearing loss beyond, bilaterally. Reliability was good. Speech reception thresholds were in good agreement with the pure tone averages, bilaterally. Speech discrimination scores were excellent, bilaterally.    Tympanometry revealed normal middle ear peak pressure and compliance, bilaterally.    The results were reviewed with the patient and ordering provider.     Recommendations for follow up will be made pending physician consult.      Shantell Hamilton, CCC-A  Clinical Audiologist  [place] License: [id number]    Electronically signed by Leonor Mendoza on 8/1/2025 at 9:48 AM

## 2025-08-20 ENCOUNTER — OFFICE VISIT (OUTPATIENT)
Dept: FAMILY MEDICINE CLINIC | Age: 65
End: 2025-08-20

## 2025-08-20 VITALS
OXYGEN SATURATION: 98 % | HEART RATE: 76 BPM | RESPIRATION RATE: 18 BRPM | SYSTOLIC BLOOD PRESSURE: 122 MMHG | BODY MASS INDEX: 30.63 KG/M2 | WEIGHT: 156 LBS | HEIGHT: 60 IN | TEMPERATURE: 97.5 F | DIASTOLIC BLOOD PRESSURE: 74 MMHG

## 2025-08-20 DIAGNOSIS — M54.32 BILATERAL SCIATICA: Primary | ICD-10-CM

## 2025-08-20 DIAGNOSIS — M54.31 BILATERAL SCIATICA: Primary | ICD-10-CM

## 2025-08-20 DIAGNOSIS — M81.0 AGE-RELATED OSTEOPOROSIS WITHOUT CURRENT PATHOLOGICAL FRACTURE: ICD-10-CM

## 2025-08-20 DIAGNOSIS — E78.2 MIXED HYPERLIPIDEMIA: ICD-10-CM

## 2025-08-20 DIAGNOSIS — R53.83 FATIGUE, UNSPECIFIED TYPE: ICD-10-CM

## 2025-08-20 DIAGNOSIS — R73.01 IFG (IMPAIRED FASTING GLUCOSE): ICD-10-CM

## 2025-08-20 RX ORDER — DEXAMETHASONE SODIUM PHOSPHATE 4 MG/ML
4 INJECTION, SOLUTION INTRA-ARTICULAR; INTRALESIONAL; INTRAMUSCULAR; INTRAVENOUS; SOFT TISSUE ONCE
Status: COMPLETED | OUTPATIENT
Start: 2025-08-20 | End: 2025-08-20

## 2025-08-20 RX ORDER — METHYLPREDNISOLONE 4 MG/1
TABLET ORAL
Qty: 1 KIT | Refills: 0 | Status: SHIPPED | OUTPATIENT
Start: 2025-08-20 | End: 2025-08-26

## 2025-08-20 RX ORDER — BACLOFEN 10 MG/1
10 TABLET ORAL 2 TIMES DAILY PRN
Qty: 30 TABLET | Refills: 1 | Status: SHIPPED | OUTPATIENT
Start: 2025-08-20

## 2025-08-20 RX ADMIN — DEXAMETHASONE SODIUM PHOSPHATE 4 MG: 4 INJECTION, SOLUTION INTRA-ARTICULAR; INTRALESIONAL; INTRAMUSCULAR; INTRAVENOUS; SOFT TISSUE at 08:55

## 2025-08-22 ENCOUNTER — HOSPITAL ENCOUNTER (OUTPATIENT)
Dept: LAB | Age: 65
Discharge: HOME | End: 2025-08-22
Payer: MEDICARE

## 2025-08-22 DIAGNOSIS — R53.83 FATIGUE, UNSPECIFIED TYPE: ICD-10-CM

## 2025-08-22 DIAGNOSIS — E78.2 MIXED HYPERLIPIDEMIA: ICD-10-CM

## 2025-08-22 DIAGNOSIS — R73.01 IFG (IMPAIRED FASTING GLUCOSE): ICD-10-CM

## 2025-08-22 DIAGNOSIS — M54.31 BILATERAL SCIATICA: ICD-10-CM

## 2025-08-22 DIAGNOSIS — M54.32 BILATERAL SCIATICA: ICD-10-CM

## 2025-08-22 PROBLEM — M81.0 AGE-RELATED OSTEOPOROSIS WITHOUT CURRENT PATHOLOGICAL FRACTURE: Status: ACTIVE | Noted: 2025-08-22

## 2025-08-22 LAB
ALBUMIN SERPL-MCNC: 4.3 G/DL (ref 3.5–5.2)
ALP SERPL-CCNC: 68 U/L (ref 35–104)
ALT SERPL-CCNC: 17 U/L (ref 0–35)
ANION GAP SERPL CALCULATED.3IONS-SCNC: 12 MMOL/L (ref 7–16)
AST SERPL-CCNC: 24 U/L (ref 0–35)
BASOPHILS # BLD: 0.03 K/UL (ref 0–0.2)
BASOPHILS NFR BLD: 0 % (ref 0–2)
BILIRUB SERPL-MCNC: 0.4 MG/DL (ref 0–1.2)
BUN SERPL-MCNC: 15 MG/DL (ref 8–23)
CALCIUM SERPL-MCNC: 9.3 MG/DL (ref 8.8–10.2)
CHLORIDE SERPL-SCNC: 105 MMOL/L (ref 98–107)
CHOLEST SERPL-MCNC: 187 MG/DL
CO2 SERPL-SCNC: 25 MMOL/L (ref 22–29)
CREAT SERPL-MCNC: 0.7 MG/DL (ref 0.5–1)
EOSINOPHIL # BLD: 0.04 K/UL (ref 0.05–0.5)
EOSINOPHILS RELATIVE PERCENT: 1 % (ref 0–6)
ERYTHROCYTE [DISTWIDTH] IN BLOOD BY AUTOMATED COUNT: 12.8 % (ref 11.5–15)
GFR, ESTIMATED: 88 ML/MIN/1.73M2
GLUCOSE SERPL-MCNC: 98 MG/DL (ref 74–99)
HBA1C MFR BLD: 6.1 % (ref 4–5.6)
HCT VFR BLD AUTO: 37.8 % (ref 34–48)
HDLC SERPL-MCNC: 73 MG/DL
HGB BLD-MCNC: 13.2 G/DL (ref 11.5–15.5)
IMM GRANULOCYTES # BLD AUTO: 0.04 K/UL (ref 0–0.58)
IMM GRANULOCYTES NFR BLD: 1 % (ref 0–5)
LDLC SERPL CALC-MCNC: 95 MG/DL
LYMPHOCYTES NFR BLD: 1.95 K/UL (ref 1.5–4)
LYMPHOCYTES RELATIVE PERCENT: 28 % (ref 20–42)
MCH RBC QN AUTO: 30.7 PG (ref 26–35)
MCHC RBC AUTO-ENTMCNC: 34.9 G/DL (ref 32–34.5)
MCV RBC AUTO: 87.9 FL (ref 80–99.9)
MONOCYTES NFR BLD: 0.48 K/UL (ref 0.1–0.95)
MONOCYTES NFR BLD: 7 % (ref 2–12)
NEUTROPHILS NFR BLD: 64 % (ref 43–80)
NEUTS SEG NFR BLD: 4.42 K/UL (ref 1.8–7.3)
PLATELET # BLD AUTO: 324 K/UL (ref 130–450)
PMV BLD AUTO: 9.4 FL (ref 7–12)
POTASSIUM SERPL-SCNC: 4.1 MMOL/L (ref 3.5–5.1)
PROT SERPL-MCNC: 7.1 G/DL (ref 6.4–8.3)
RBC # BLD AUTO: 4.3 M/UL (ref 3.5–5.5)
SODIUM SERPL-SCNC: 143 MMOL/L (ref 136–145)
TRIGL SERPL-MCNC: 95 MG/DL
TSH SERPL DL<=0.05 MIU/L-ACNC: 1.81 UIU/ML (ref 0.27–4.2)
VLDLC SERPL CALC-MCNC: 19 MG/DL
WBC OTHER # BLD: 7 K/UL (ref 4.5–11.5)

## 2025-08-22 PROCEDURE — 84443 ASSAY THYROID STIM HORMONE: CPT

## 2025-08-22 PROCEDURE — 85025 COMPLETE CBC W/AUTO DIFF WBC: CPT

## 2025-08-22 PROCEDURE — 80061 LIPID PANEL: CPT

## 2025-08-22 PROCEDURE — 83036 HEMOGLOBIN GLYCOSYLATED A1C: CPT

## 2025-08-22 PROCEDURE — 80053 COMPREHEN METABOLIC PANEL: CPT

## 2025-08-22 PROCEDURE — 36415 COLL VENOUS BLD VENIPUNCTURE: CPT

## 2025-08-22 ASSESSMENT — ENCOUNTER SYMPTOMS
VOMITING: 0
SORE THROAT: 0
EYE ITCHING: 0
WHEEZING: 0
COUGH: 0
RHINORRHEA: 0
TROUBLE SWALLOWING: 0
CHOKING: 0
BACK PAIN: 1
FACIAL SWELLING: 0
EYE DISCHARGE: 0
BLOOD IN STOOL: 0
PHOTOPHOBIA: 0
RECTAL PAIN: 0
VOICE CHANGE: 0
SHORTNESS OF BREATH: 0
RESPIRATORY NEGATIVE: 1
CHEST TIGHTNESS: 0
ALLERGIC/IMMUNOLOGIC NEGATIVE: 1
APNEA: 0
NAUSEA: 0
STRIDOR: 0
CONSTIPATION: 0
SINUS PAIN: 0
SINUS PRESSURE: 0
ANAL BLEEDING: 0
EYE REDNESS: 0
ABDOMINAL PAIN: 0
EYE PAIN: 0
ABDOMINAL DISTENTION: 0
COLOR CHANGE: 0
DIARRHEA: 0

## (undated) DEVICE — CONTAINER SPEC 60ML PH 7NEUTRAL BUFF FRMLN RDY TO USE

## (undated) DEVICE — FORCEPS BX L160CM JAW DIA2.4MM YEL L CAP W/ NDL DISP RAD

## (undated) DEVICE — FORCEPS BX L240CM JAW DIA2.4MM ORNG L CAP W/ NDL DISP RAD

## (undated) DEVICE — CONNECTOR IRRIGATION AUXILIARY H2O JET W/ PRT MTL THRD HYDR

## (undated) DEVICE — DEFENDO AIR WATER SUCTION AND BIOPSY VALVE KIT FOR  OLYMPUS: Brand: DEFENDO AIR/WATER/SUCTION AND BIOPSY VALVE

## (undated) DEVICE — FORMALIN  10%NBF 60ML PREFLL CONT

## (undated) DEVICE — BITEBLOCK 54FR W/ DENT RIM BLOX

## (undated) DEVICE — Z DISCONTINUED NO SUB IDED TUBING ETCO2 AD L6.5FT NSL ORAL CVD PRNG NONFLARED TIP OVR

## (undated) DEVICE — GAUZE,SPONGE,POST-OP,4X3,STRL,LF: Brand: MEDLINE

## (undated) DEVICE — GAUZE,SPONGE,4"X4",8PLY,STRL,LF,10/TRAY: Brand: MEDLINE